# Patient Record
Sex: FEMALE | Employment: UNEMPLOYED | URBAN - METROPOLITAN AREA
[De-identification: names, ages, dates, MRNs, and addresses within clinical notes are randomized per-mention and may not be internally consistent; named-entity substitution may affect disease eponyms.]

---

## 2022-09-02 ENCOUNTER — HOSPITAL ENCOUNTER (OUTPATIENT)
Dept: CT IMAGING | Age: 62
Discharge: HOME | End: 2022-09-05

## 2022-09-02 DIAGNOSIS — K59.09 OTHER CONSTIPATION: ICD-10-CM

## 2022-09-02 DIAGNOSIS — R12 HEARTBURN: ICD-10-CM

## 2022-09-02 DIAGNOSIS — R10.10 UPPER ABDOMINAL PAIN, UNSPECIFIED: ICD-10-CM

## 2022-09-02 DIAGNOSIS — R14.0 ABDOMINAL DISTENTION: ICD-10-CM

## 2022-09-26 ENCOUNTER — HOSPITAL ENCOUNTER (EMERGENCY)
Age: 62
Discharge: HOME | End: 2022-09-26
Attending: EMERGENCY MEDICINE
Payer: COMMERCIAL

## 2022-09-26 ENCOUNTER — HOSPITAL ENCOUNTER (EMERGENCY)
Dept: CT IMAGING | Age: 62
Discharge: HOME | End: 2022-09-29
Payer: COMMERCIAL

## 2022-09-26 VITALS
TEMPERATURE: 97.6 F | BODY MASS INDEX: 34.15 KG/M2 | HEART RATE: 111 BPM | DIASTOLIC BLOOD PRESSURE: 84 MMHG | OXYGEN SATURATION: 97 % | RESPIRATION RATE: 18 BRPM | SYSTOLIC BLOOD PRESSURE: 173 MMHG | WEIGHT: 200 LBS | HEIGHT: 64 IN

## 2022-09-26 DIAGNOSIS — R10.30 LOWER ABDOMINAL PAIN: Primary | ICD-10-CM

## 2022-09-26 DIAGNOSIS — R19.00 PELVIC MASS IN FEMALE: ICD-10-CM

## 2022-09-26 DIAGNOSIS — N30.01 ACUTE CYSTITIS WITH HEMATURIA: ICD-10-CM

## 2022-09-26 LAB
ALBUMIN SERPL-MCNC: 3.5 G/DL (ref 3.2–4.6)
ALBUMIN/GLOB SERPL: 0.7 {RATIO} (ref 1.2–3.5)
ALP SERPL-CCNC: 90 U/L (ref 50–130)
ALT SERPL-CCNC: 19 U/L (ref 12–65)
ANION GAP SERPL CALC-SCNC: 11 MMOL/L (ref 4–13)
APPEARANCE UR: ABNORMAL
AST SERPL-CCNC: 18 U/L (ref 15–37)
BACTERIA URNS QL MICRO: 0 /HPF
BASOPHILS # BLD: 0 K/UL (ref 0–0.2)
BASOPHILS NFR BLD: 1 % (ref 0–2)
BILIRUB SERPL-MCNC: 0.8 MG/DL (ref 0.2–1.1)
BILIRUB UR QL: NEGATIVE
BUN SERPL-MCNC: 12 MG/DL (ref 8–23)
CALCIUM SERPL-MCNC: 10.2 MG/DL (ref 8.3–10.4)
CASTS URNS QL MICRO: 0 /LPF
CHLORIDE SERPL-SCNC: 104 MMOL/L (ref 101–110)
CO2 SERPL-SCNC: 22 MMOL/L (ref 21–32)
COLOR UR: ABNORMAL
CREAT SERPL-MCNC: 0.65 MG/DL (ref 0.6–1)
CRYSTALS URNS QL MICRO: 0 /LPF
DIFFERENTIAL METHOD BLD: NORMAL
EOSINOPHIL # BLD: 0.1 K/UL (ref 0–0.8)
EOSINOPHIL NFR BLD: 1 % (ref 0.5–7.8)
EPI CELLS #/AREA URNS HPF: NORMAL /HPF
ERYTHROCYTE [DISTWIDTH] IN BLOOD BY AUTOMATED COUNT: 13.2 % (ref 11.9–14.6)
GLOBULIN SER CALC-MCNC: 4.7 G/DL (ref 2.3–3.5)
GLUCOSE SERPL-MCNC: 69 MG/DL (ref 65–100)
GLUCOSE UR STRIP.AUTO-MCNC: NEGATIVE MG/DL
HCT VFR BLD AUTO: 43.7 % (ref 35.8–46.3)
HGB BLD-MCNC: 13.8 G/DL (ref 11.7–15.4)
HGB UR QL STRIP: ABNORMAL
IMM GRANULOCYTES # BLD AUTO: 0 K/UL (ref 0–0.5)
IMM GRANULOCYTES NFR BLD AUTO: 0 % (ref 0–5)
KETONES UR QL STRIP.AUTO: >80 MG/DL
LACTATE SERPL-SCNC: 0.7 MMOL/L (ref 0.4–2)
LEUKOCYTE ESTERASE UR QL STRIP.AUTO: ABNORMAL
LIPASE SERPL-CCNC: 115 U/L (ref 73–393)
LYMPHOCYTES # BLD: 1.8 K/UL (ref 0.5–4.6)
LYMPHOCYTES NFR BLD: 21 % (ref 13–44)
MCH RBC QN AUTO: 27.9 PG (ref 26.1–32.9)
MCHC RBC AUTO-ENTMCNC: 31.6 G/DL (ref 31.4–35)
MCV RBC AUTO: 88.5 FL (ref 79.6–97.8)
MONOCYTES # BLD: 0.7 K/UL (ref 0.1–1.3)
MONOCYTES NFR BLD: 8 % (ref 4–12)
MUCOUS THREADS URNS QL MICRO: 0 /LPF
NEUTS SEG # BLD: 6.1 K/UL (ref 1.7–8.2)
NEUTS SEG NFR BLD: 70 % (ref 43–78)
NITRITE UR QL STRIP.AUTO: NEGATIVE
NRBC # BLD: 0 K/UL (ref 0–0.2)
PH UR STRIP: 5 [PH] (ref 5–9)
PLATELET # BLD AUTO: 388 K/UL (ref 150–450)
PMV BLD AUTO: 9.7 FL (ref 9.4–12.3)
POTASSIUM SERPL-SCNC: 3.8 MMOL/L (ref 3.5–5.1)
PROT SERPL-MCNC: 8.2 G/DL (ref 6.3–8.2)
PROT UR STRIP-MCNC: 30 MG/DL
RBC # BLD AUTO: 4.94 M/UL (ref 4.05–5.2)
RBC #/AREA URNS HPF: NORMAL /HPF
SODIUM SERPL-SCNC: 137 MMOL/L (ref 136–145)
SP GR UR REFRACTOMETRY: >1.035 (ref 1–1.02)
UROBILINOGEN UR QL STRIP.AUTO: 0.2 EU/DL (ref 0.2–1)
WBC # BLD AUTO: 8.7 K/UL (ref 4.3–11.1)
WBC URNS QL MICRO: NORMAL /HPF

## 2022-09-26 PROCEDURE — 6360000004 HC RX CONTRAST MEDICATION: Performed by: EMERGENCY MEDICINE

## 2022-09-26 PROCEDURE — 83690 ASSAY OF LIPASE: CPT

## 2022-09-26 PROCEDURE — 80053 COMPREHEN METABOLIC PANEL: CPT

## 2022-09-26 PROCEDURE — 83605 ASSAY OF LACTIC ACID: CPT

## 2022-09-26 PROCEDURE — 99285 EMERGENCY DEPT VISIT HI MDM: CPT

## 2022-09-26 PROCEDURE — 74177 CT ABD & PELVIS W/CONTRAST: CPT

## 2022-09-26 PROCEDURE — 6370000000 HC RX 637 (ALT 250 FOR IP): Performed by: NURSE PRACTITIONER

## 2022-09-26 PROCEDURE — 81001 URINALYSIS AUTO W/SCOPE: CPT

## 2022-09-26 PROCEDURE — 2580000003 HC RX 258: Performed by: EMERGENCY MEDICINE

## 2022-09-26 PROCEDURE — 85025 COMPLETE CBC W/AUTO DIFF WBC: CPT

## 2022-09-26 PROCEDURE — 2580000003 HC RX 258: Performed by: NURSE PRACTITIONER

## 2022-09-26 RX ORDER — 0.9 % SODIUM CHLORIDE 0.9 %
1000 INTRAVENOUS SOLUTION INTRAVENOUS ONCE
Status: COMPLETED | OUTPATIENT
Start: 2022-09-26 | End: 2022-09-26

## 2022-09-26 RX ORDER — 0.9 % SODIUM CHLORIDE 0.9 %
100 INTRAVENOUS SOLUTION INTRAVENOUS
Status: COMPLETED | OUTPATIENT
Start: 2022-09-26 | End: 2022-09-26

## 2022-09-26 RX ORDER — ONDANSETRON 2 MG/ML
4 INJECTION INTRAMUSCULAR; INTRAVENOUS ONCE
Status: DISCONTINUED | OUTPATIENT
Start: 2022-09-26 | End: 2022-09-26

## 2022-09-26 RX ORDER — MORPHINE SULFATE 4 MG/ML
4 INJECTION INTRAVENOUS
Status: DISCONTINUED | OUTPATIENT
Start: 2022-09-26 | End: 2022-09-26

## 2022-09-26 RX ORDER — SODIUM CHLORIDE 0.9 % (FLUSH) 0.9 %
10 SYRINGE (ML) INJECTION
Status: DISCONTINUED | OUTPATIENT
Start: 2022-09-26 | End: 2022-09-30 | Stop reason: HOSPADM

## 2022-09-26 RX ORDER — CEPHALEXIN 500 MG/1
500 CAPSULE ORAL 2 TIMES DAILY
Qty: 14 CAPSULE | Refills: 0 | Status: SHIPPED | OUTPATIENT
Start: 2022-09-26 | End: 2022-10-03

## 2022-09-26 RX ORDER — ACETAMINOPHEN 500 MG
1000 TABLET ORAL
Status: COMPLETED | OUTPATIENT
Start: 2022-09-26 | End: 2022-09-26

## 2022-09-26 RX ADMIN — SODIUM CHLORIDE 1000 ML: 9 INJECTION, SOLUTION INTRAVENOUS at 10:49

## 2022-09-26 RX ADMIN — ACETAMINOPHEN 1000 MG: 500 TABLET, FILM COATED ORAL at 11:05

## 2022-09-26 RX ADMIN — IOPAMIDOL 100 ML: 755 INJECTION, SOLUTION INTRAVENOUS at 12:23

## 2022-09-26 RX ADMIN — SODIUM CHLORIDE 100 ML: 9 INJECTION, SOLUTION INTRAVENOUS at 12:24

## 2022-09-26 ASSESSMENT — PAIN DESCRIPTION - LOCATION: LOCATION: ABDOMEN

## 2022-09-26 ASSESSMENT — PAIN SCALES - GENERAL
PAINLEVEL_OUTOF10: 0
PAINLEVEL_OUTOF10: 0

## 2022-09-26 NOTE — ED NOTES
I have reviewed discharge instructions with the patient. The patient verbalized understanding. Patient left ED via Discharge Method: ambulatory to Home with self. Opportunity for questions and clarification provided. Patient given 1 scripts. To continue your aftercare when you leave the hospital, you may receive an automated call from our care team to check in on how you are doing. This is a free service and part of our promise to provide the best care and service to meet your aftercare needs.  If you have questions, or wish to unsubscribe from this service please call [phone]. Thank you for Choosing our [facility] Emergency Department.         Rachelle Ellis RN  09/26/22 2774

## 2022-09-26 NOTE — ED TRIAGE NOTES
Pt states that she's been having left sided abdominal pain for about 2 months. Pt states that the pain radiates out to the rest of her abdomen. Pt endorses that she was recently diagnosed with a mass on her left ovary about a month ago and has an appointment with GYN but states that the pain has gotten much worse. Pt also endorses some vagina bleeding starting this morning.

## 2022-09-26 NOTE — DISCHARGE INSTRUCTIONS
Your CT scan again showed a large cystic mass originating in your pelvis. It is quite important that you follow-up closely with gynecology for this. Call above gynecologist Tuesday for earliest possible follow-up. Return for new or worsening symptoms.

## 2022-09-26 NOTE — ED PROVIDER NOTES
[facility] Emergency Department Provider Note                   PCP:                None Provider               Age: 60 y.o. Sex: female       ICD-10-CM    1. Lower abdominal pain  R10.30       2. Pelvic mass in female  R19.00       3. Acute cystitis with hematuria  N30.01           DISPOSITION    Discharged    MDM     HPI as charted. Pt neck is supple, no meningeal signs. Lungs are clear to auscultation, no diminished sounds. Abdomen is soft and diffusely tender, has normoactive bowel sounds, no flank or CVA tenderness. Reports ongoing symptoms, states she has follow-up with GYN in approximately 1 month. Was seen by new PCP ordered CT scan with findings as below:    9/14:     1. 16.2 x 26.1 x 26.3 complex cystic and solid mass arising the central pelvis   and extending through the upper abdomen. 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 OB/GYN evaluation is recommended. Discussed concerns with patient and , obtain labs and UA which were reviewed. Labs are quite reassuring, UA concerning for UTI and we will treat. With patient's report of increasing abdominal pain and distention and prior concerning CT findings, patient repeat CT was obtained. Findings as below, slight increase in size of complex mass, but thought to be otherwise stable. Stable for discharge home, but thinks that close GYN follow-up would be in the patient's best interest.  I discussed with the patient and her  findings and concern that this mass very realistically could represent cancer and that it is imperative she have close appropriate follow-up. He verbalized understanding. I have called and got GYN follow-up contact for the patient advised to call upon discharge this morning schedule earliest possible follow-up. Strict return to ED for care instruction provided. Advised to follow-up with PCP in 1-2 days. Return to ED immediately for any new, worsening, concerning symptoms.    Patient is very well-hydrated appearing, no distress, pleasant and conversational.  Nontoxic-appearing, tolerating oral intake. Patient is hemodynamically stable and in no acute distress. Patient is not ill-appearing. Discussed patient with attending who reviewed the patient's results and collaborated on care planning. All findings and plans were discussed with the patient. Patient verbalizes desire to be discharged home at this time. All questions answered. Discussed with the patient that an unremarkable evaluation in the ED does not preclude the development or presence of a serious or life threatening condition. Patient was instructed to return immediately for any worsening or change in current symptoms, or if symptoms do not continue to improve. I instructed them to follow up with their primary care provider, own specialist, or medical provider that I am recommending for him within the next 2-3 days     the patient acknowledged understanding plan of care and affirmed approval. Patient is discharged home, with no further complaint. Orders Placed This Encounter   Procedures    CBC with Diff    CMP    Lipase    Diet NPO    POCT Urine Dipstick    Saline lock IV        Lakeshia Mckay is a 60 y.o. female who presents to the Emergency Department with chief complaint of    Chief Complaint   Patient presents with    Abdominal Pain    Vaginal Bleeding      HPI  58-year-old female presents to the ED with her  with complaint of worsening abdominal pain, distention and new constipation. States that she has had progressively worsening pain in her lower abdomen x2 months, which has increased significant over the last 3 days. States that when wiping herself on toilet paper after urinating this morning, she noticed a small amount of blood on toilet paper x1. No bleeding, no visualized blood in urine. Denies black or bloody stools, states she is not had a bowel movement in the last few days.   Or endorses decreased appetite and decreased oral intake. Denies weight loss. States that her abdominal pain was not originally localized to the lower left quadrant of her abdomen, now spread across her entire abdomen with new onset distention and tenderness. Are new in the area and have no real establish health care. Were able to be seen by provider last month for this complaint who ordered an outpatient image. States that they were told that there was a left ovarian cyst but nothing further. States that they were able to schedule appointment with an OB/GYN, but that appointment remains 3+ weeks away. They called back to the provider originally evaluated them who told him to come to the emergency department where we could facilitate closer care. Denies fever/chills, blurred vision, headaches, neck pain/stiffness. Alert & oriented x 3, afebrile, hemodynamically stable, non-toxic appearing, appears in no distress. Medical/surgical/social history reviewed with the patient. Review of Systems  Constitutional: Negative for fever. Negative for appetite change, chills, diaphoresis and unexpected weight change. HENT: As in HPI     Eyes: Negative. Respiratory: As in HPI   Cardiovascular: Negative. GI/: As in HPI      Musculoskeletal: As in HPI   Skin: Negative. Allergic/Immunologic: Negative. Neurological: Negative. History reviewed. No pertinent past medical history. Past Surgical History:   Procedure Laterality Date    HERNIA REPAIR      TONSILLECTOMY          History reviewed. No pertinent family history. Social History     Socioeconomic History    Marital status:      Spouse name: None    Number of children: None    Years of education: None    Highest education level: None         Patient has no known allergies. Previous Medications    No medications on file        Vitals signs and nursing note reviewed.    Patient Vitals for the past 4 hrs:   Temp Pulse Resp BP SpO2   09/26/22 0929 97.8 °F (36.6 °C) (!) 111 18 (!) 144/75 99 %          Physical Exam   Constitutional: Oriented to person, place, and time. Appears well-developed and well-nourished. No distress. HENT:    Head: Normocephalic and atraumatic. Right Ear: External ear normal.    Left Ear: External ear normal.    Nose: Nose normal.    Mouth/Throat: Mouth normal. Uvula midline, no erythema/exudates/edema. No drooling, no voice change. No pain with ROM of neck. Eyes: Conjunctivae are normal.   Neck: Supple. No tracheal deviation. Not tender, not rigid, no menningeal signs. Cardiovascular: Normal rate, intact distal pulses. Pulmonary/Chest: No respiratory distress. Abdominal:  and distended with tenderness and guarding diffusely. Normoactive sounds. No flank or CVA tenderness. Musculoskeletal: No obvious deformity, erythema, edema. Neurological: Alert and oriented to person, place, and time. Skin:  No abrasion, no lesion, no petechiae and no rash noted. Not diaphoretic. No cyanosis, erythema, or pallor. Psychiatric: Normal mood and affect. Behavior is normal.    Nursing note and vitals reviewed. Procedures      Labs Reviewed   COMPREHENSIVE METABOLIC PANEL - Abnormal; Notable for the following components:       Result Value    Globulin 4.7 (*)     Albumin/Globulin Ratio 0.7 (*)     All other components within normal limits   URINALYSIS - Abnormal; Notable for the following components:    Specific Gravity, UA >1.035 (*)     Protein, UA 30 (*)     Ketones, Urine >80 (*)     Blood, Urine LARGE (*)     Leukocyte Esterase, Urine MODERATE (*)     All other components within normal limits   CBC WITH AUTO DIFFERENTIAL   LIPASE   LACTIC ACID   URINALYSIS, MICRO        CT ABDOMEN PELVIS W IV CONTRAST Additional Contrast? None   Final Result   Large, complex, largely cystic mass originating from the pelvis and concerning   for for an ovarian neoplasm measures slightly larger in today's study but is   otherwise grossly stable.  No acute findings in the abdomen or pelvis. Voice dictation software was used during the making of this note. This software is not perfect and grammatical and other typographical errors may be present. This note has not been completely proofread for errors.          FARHANA De Guzman - CNP  09/26/22 2100

## 2022-10-03 ENCOUNTER — TELEPHONE (OUTPATIENT)
Dept: OBGYN CLINIC | Age: 62
End: 2022-10-03

## 2022-10-03 ENCOUNTER — OFFICE VISIT (OUTPATIENT)
Dept: OBGYN CLINIC | Age: 62
End: 2022-10-03
Payer: COMMERCIAL

## 2022-10-03 VITALS
WEIGHT: 207 LBS | HEIGHT: 63 IN | DIASTOLIC BLOOD PRESSURE: 78 MMHG | SYSTOLIC BLOOD PRESSURE: 128 MMHG | BODY MASS INDEX: 36.68 KG/M2

## 2022-10-03 DIAGNOSIS — Z12.4 SCREENING FOR CERVICAL CANCER: ICD-10-CM

## 2022-10-03 DIAGNOSIS — N83.8 OVARIAN MASS: Primary | ICD-10-CM

## 2022-10-03 DIAGNOSIS — Z12.11 SCREENING FOR COLON CANCER: ICD-10-CM

## 2022-10-03 LAB
HEMOCCULT STL QL: NEGATIVE
VALID INTERNAL CONTROL: YES

## 2022-10-03 PROCEDURE — 99215 OFFICE O/P EST HI 40 MIN: CPT | Performed by: OBSTETRICS & GYNECOLOGY

## 2022-10-03 PROCEDURE — 82272 OCCULT BLD FECES 1-3 TESTS: CPT | Performed by: OBSTETRICS & GYNECOLOGY

## 2022-10-03 NOTE — TELEPHONE ENCOUNTER
Attempted to contact patient to discuss possible dates for recommended surgery per Dr Taiwo Vera ([facility]). N/A, L/M on voicemail for patient to return my call at her earliest convenience. Pt returned call and surgery is scheduled for 10/28/22.

## 2022-10-03 NOTE — PROGRESS NOTES
The patient is here for  problems including 28 cm pel mass     HISTORY:      No LMP recorded (lmp unknown). (Menstrual status: Other - See Notes). SEE BELOW      Current Outpatient Medications on File Prior to Visit   Medication Sig Dispense Refill    cephALEXin (KEFLEX) 500 MG capsule Take 1 capsule by mouth 2 times daily for 7 days 14 capsule 0     No current facility-administered medications on file prior to visit. SEE LIST    ROS:      PHYSICAL EXAM:  Blood pressure 128/78, height 5' 3\" (1.6 m), weight 207 lb (93.9 kg). The patient appears well, alert, oriented x 3, in no distress. Lungs are clear. Heart RRR, no murmurs. Abdomen soft without tenderness, guarding, mass or organomegaly. Pelvic: bg     ASSESSMENT:DIAGNOSIS DISCUSSED INCLUDING DIFFERENTIAL    PLAN:    Orders Placed This Encounter   Procedures    PAP LB, Reflex HPV ASCUS     Standing Status:   Future     Number of Occurrences:   1     Standing Expiration Date:   10/3/2023     Order Specific Question:   Pap Source? (Required)     Answer:   cervical     Order Specific Question:   Pap Source? (Required)     Answer:   endocervical     Order Specific Question:   Pap collection method? (Required     Answer:   brush     Order Specific Question:   Pap collection method? (Required     Answer:   spatula     Order Specific Question:   Menstrual Status ? Answer:   Postmenopausal [2]     Order Specific Question:   Previous Treatment? (Required)     Answer:   NONE    Cancer Antigen 125     Standing Status:   Future     Number of Occurrences:   1     Standing Expiration Date:   10/3/2023    AMB POC FECAL BLOOD, OCCULT, QL 1 CARD     Complex high risk decision making many questions answered history reviewed precharting done required 40 minute of time discussing a vaiety of problems  goals are set  follow up planned   En Rowan  is a 60 y.o. No obstetric history on file.   who is here for an annual exam.      History  History reviewed. No pertinent past medical history. ON FILE  Past Surgical History:   Procedure Laterality Date    HERNIA REPAIR      TONSILLECTOMY      PRESENT IN FILE  Current Outpatient Medications on File Prior to Visit   Medication Sig Dispense Refill    cephALEXin (KEFLEX) 500 MG capsule Take 1 capsule by mouth 2 times daily for 7 days 14 capsule 0     No current facility-administered medications on file prior to visit. SEE UPDATED LIST  No Known AllergiesSEE LIST  Social History     Tobacco Use    Smoking status: Not on file    Smokeless tobacco: Not on file   Substance Use Topics    Alcohol use: Not on file      History reviewed. No pertinent family history. OBGYN History:             Physical Exam  Blood pressure 128/78, height 5' 3\" (1.6 m), weight 207 lb (93.9 kg). Body mass index is 36.67 kg/m². Lab Results   Component Value Date/Time    HGB 13.8 09/26/2022 10:13 AM      @LASTPROCAMB([id number])@  No results found for: Georgeana Los, HCGQR, THCGA1    HEENT unremarkable. Sclera non-icteric. Neck is supple without thyromegaly or nodes. Chest clear to auscultation. Heart regular rate and rhythm with no murmur, Breast exam reveals no masses or nipple discharge. No axillary notes are palpable. Abdomen is benign. BUS is normal.  Cervix IF  present. Pap smeaR performed. PRN  Bimanual exam reveals no masses. Assessment  60 y.o. No obstetric history on file. for annual exam.  No diagnosis found. Plan  No orders of the defined types were placed in this encounter. 28 cm pel mass  col ok per ct  no asci/nodes ca125 p  for [facility] [employer] takes no meds no cardiac dx   recent ct  pulm atelect lung work discussed no rivera  Pt aware all risks including anesthesia,infection. blood loss,transfusion,injury to bowel bladder[colostomy/longterm huffman] internal organs ,risk of not being able to accomplish procedure , risk of longterm resid pain,dvt  and  ,pe    \"DEXA ordered\",AT AGE 61          \"Screening olonoscopy ordered\",IF >44YO  DUE         \"Recommend Calcium/MVI\",IF >35YRS  \"Recommend Gardisil immunization\"IF AGE 9-45     }CONTRACEPTION DISCUSSED      STD CHECK OFFERED IF <30YO  ,MAMMOGRAM SCHEDULED IF >41YO          MOOD DISCUSSED             NOT SUICIDAL  HEALTH MEASURE DISCUSSED INCLUDING TEETH/EYE Barbra Stock  APPTS                    DIET/EXERCISE /SLEEP    DISCUSSED                SMOKING DISCUSSED PRN      BLADDER CONTROL DISCUSSED

## 2022-10-04 ENCOUNTER — PREP FOR PROCEDURE (OUTPATIENT)
Dept: OBGYN CLINIC | Age: 62
End: 2022-10-04

## 2022-10-04 PROBLEM — D25.1 INTRAMURAL LEIOMYOMA OF UTERUS: Status: ACTIVE | Noted: 2022-10-04

## 2022-10-04 LAB — CANCER AG125 SERPL-ACNC: 108 U/ML (ref 1.5–35)

## 2022-10-05 ENCOUNTER — TELEPHONE (OUTPATIENT)
Dept: OBGYN CLINIC | Age: 62
End: 2022-10-05

## 2022-10-05 DIAGNOSIS — N83.8 OVARIAN MASS: Primary | ICD-10-CM

## 2022-10-05 NOTE — TELEPHONE ENCOUNTER
Called the pt to let her know that her blood work  was elevated and  wants to transfer her care to [address] [place] [address].  referral put on [facility] as urgent referral.

## 2022-10-06 LAB
CYTOLOGIST CVX/VAG CYTO: NORMAL
CYTOLOGY CVX/VAG DOC THIN PREP: NORMAL
HPV REFLEX: NORMAL
Lab: NORMAL
PATH REPORT.FINAL DX SPEC: NORMAL
STAT OF ADQ CVX/VAG CYTO-IMP: NORMAL

## 2022-10-07 ENCOUNTER — PREP FOR PROCEDURE (OUTPATIENT)
Dept: ONCOLOGY | Age: 62
End: 2022-10-07

## 2022-10-07 ENCOUNTER — OFFICE VISIT (OUTPATIENT)
Dept: ONCOLOGY | Age: 62
End: 2022-10-07
Payer: COMMERCIAL

## 2022-10-07 VITALS
SYSTOLIC BLOOD PRESSURE: 153 MMHG | DIASTOLIC BLOOD PRESSURE: 93 MMHG | BODY MASS INDEX: 34.91 KG/M2 | WEIGHT: 204.5 LBS | OXYGEN SATURATION: 97 % | RESPIRATION RATE: 18 BRPM | HEIGHT: 64 IN | TEMPERATURE: 98.1 F | HEART RATE: 115 BPM

## 2022-10-07 DIAGNOSIS — R19.00 ABDOMINAL MASS, UNSPECIFIED ABDOMINAL LOCATION: Primary | ICD-10-CM

## 2022-10-07 PROCEDURE — 99204 OFFICE O/P NEW MOD 45 MIN: CPT | Performed by: OBSTETRICS & GYNECOLOGY

## 2022-10-07 RX ORDER — HYDROCODONE BITARTRATE AND ACETAMINOPHEN 10; 325 MG/1; MG/1
1 TABLET ORAL EVERY 6 HOURS PRN
Qty: 40 TABLET | Refills: 0 | Status: SHIPPED | OUTPATIENT
Start: 2022-10-07 | End: 2022-10-21

## 2022-10-07 RX ORDER — PROCHLORPERAZINE MALEATE 5 MG/1
5 TABLET ORAL EVERY 6 HOURS PRN
Qty: 30 TABLET | Refills: 0 | Status: SHIPPED | OUTPATIENT
Start: 2022-10-07

## 2022-10-07 RX ORDER — ONDANSETRON 4 MG/1
4 TABLET, ORALLY DISINTEGRATING ORAL EVERY 8 HOURS PRN
Qty: 45 TABLET | Refills: 1 | Status: SHIPPED | OUTPATIENT
Start: 2022-10-07

## 2022-10-07 ASSESSMENT — ENCOUNTER SYMPTOMS
CONSTIPATION: 1
ALLERGIC/IMMUNOLOGIC NEGATIVE: 1
NAUSEA: 1
EYES NEGATIVE: 1
RESPIRATORY NEGATIVE: 1
ABDOMINAL DISTENTION: 1
ABDOMINAL PAIN: 1

## 2022-10-07 NOTE — PERIOP NOTE
Patient verified name and . Order for consent NOT found in EHR; patient verifies procedure from case posting. Type 2 surgery, phone assessment complete. Orders NOT received. Labs per surgeon: Unknown  Labs per anesthesia protocol: Hgb & Type Screen DOS     Patient answered medical/surgical history questions at their best of ability. All prior to admission medications documented in [facility]. Patient instructed to take the following medications the day of surgery according to anesthesia guidelines with a small sip of water: NONE. On the day before surgery please take Acetaminophen 1000mg in the morning and then again before bed. You may substitute for Tylenol 650 mg. Hold all vitamins 7 days prior to surgery and NSAIDS 5 days prior to surgery. Prescription meds to hold: NONE    Patient instructed on the following:    > Arrive at MAIN Entrance, time of arrival to be called the day before by 1700  > NPO after midnight, unless otherwise indicated, including gum, mints, and ice chips  > Responsible adult must drive patient to the hospital, stay during surgery, and patient will need supervision 24 hours after anesthesia  > Use antibacterial soap in shower the night before surgery and on the morning of surgery  > All piercings must be removed prior to arrival.    > Leave all valuables (money and jewelry) at home but bring insurance card and ID on DOS.   > You may be required to pay a deductible or co-pay on the day of your procedure. You can pre-pay by calling [phone] if your surgery is at the [facility] or [phone] if your surgery is at the [facility]. > Do not wear make-up, nail polish, lotions, cologne, perfumes, powders, or oil on skin. Artificial nails are not permitted.

## 2022-10-07 NOTE — PROGRESS NOTES
Date: 10/7/2022        Patient Name: Khris Paige     YOB: 1960          Chief Complaint     Chief Complaint   Patient presents with    New Patient      Large pelvic mass   >20 cm, complex   Mildly elevated ca 125   First noted early sept 2022/then again on second ct sept 26, 2022      History of Present Illness   Khris Paige is a 60 y.o. who presents today for scheduled visit    Ref: dr Angelica Head    Pt with hx abdominal pain over 1 months  Reported to pcp, imaging beginning sept 2022, >20 cm mass  To er late sept 2022, referred to dr Angelica Head, who scheduled surgery but then cancelled and consult sent to gyn oncology     Past Medical History   No past medical history on file. Past Surgical History     Past Surgical History:   Procedure Laterality Date    HERNIA REPAIR      TONSILLECTOMY          Medications      Current Outpatient Medications:     ondansetron (ZOFRAN ODT) 4 MG disintegrating tablet, Take 1 tablet by mouth every 8 hours as needed for Nausea or Vomiting, Disp: 45 tablet, Rfl: 1    prochlorperazine (COMPAZINE) 5 MG tablet, Take 1 tablet by mouth every 6 hours as needed for Nausea, Disp: 30 tablet, Rfl: 0    HYDROcodone-acetaminophen (NORCO)  MG per tablet, Take 1 tablet by mouth every 6 hours as needed for Pain for up to 14 days. Intended supply: 30 days, Disp: 40 tablet, Rfl: 0     Allergies   Patient has no known allergies. Social History     Social History       Tobacco History       Smoking Status  Never Assessed      Smokeless Tobacco Use  Unknown              Alcohol History       Alcohol Use Status  Not Asked              Drug Use       Drug Use Status  Not Asked              Sexual Activity       Sexually Active  Not Asked                    Family History   No family history on file. Review of Systems   Review of Systems   Constitutional:  Positive for activity change and appetite change. HENT: Negative. Eyes: Negative.     Respiratory: Negative. Cardiovascular: Negative. Gastrointestinal:  Positive for abdominal distention, abdominal pain, constipation and nausea. Endocrine: Negative. Genitourinary: Negative. Musculoskeletal: Negative. Skin: Negative. Allergic/Immunologic: Negative. Neurological: Negative. Hematological: Negative. Psychiatric/Behavioral: Negative. All other systems reviewed and are negative. Physical Exam     ECOG PERFORMANCE STATUS - 2 - Ambulatory and capable of all selfcare but unable to carry out any work activities. Up and about more than 50% of waking hours. Blood pressure (!) 153/93, pulse (!) 115, temperature 98.1 °F (36.7 °C), resp. rate 18, height 5' 3.78\" (1.62 m), weight 204 lb 8 oz (92.8 kg), SpO2 97 %. Physical Exam  Vitals and nursing note reviewed. Exam conducted with a chaperone present. Constitutional:       Appearance: Normal appearance. HENT:      Head: Normocephalic and atraumatic. Nose: No congestion. Cardiovascular:      Rate and Rhythm: Normal rate and regular rhythm. Pulses: Normal pulses. Pulmonary:      Effort: Pulmonary effort is normal. No respiratory distress. Breath sounds: Normal breath sounds. Abdominal:      General: Abdomen is flat. There is distension. Palpations: Abdomen is soft. There is mass. Neurological:      Mental Status: She is alert. Labs        No results found for this or any previous visit (from the past 48 hour(s)). Lab Results   Component Value Date     108 (H) 10/03/2022        Pathology      Component Ref Range & Units 10/3/22 1135    Diagnosis:   Comment    Comment: (NOTE)   NEGATIVE FOR INTRAEPITHELIAL LESION OR MALIGNANCY. Imaging/Diagnostics Last 24 Hours   No results found.     Radiology:    CT Result (most recent):  CT ABDOMEN PELVIS W IV CONTRAST 09/26/2022    Narrative  CT OF THE ABDOMEN AND PELVIS    INDICATION: Diffuse abdominal pain with recent worsening    COMPARISON: CT abdomen pelvis 9/2/2022. TECHNIQUE: Multiple axial images were obtained through the abdomen and pelvis  after intravenous injection of 100 mL Isovue 370. Intravenous contrast was used  for better evaluation of solid organs and vascular structures. Oral contrast was  used for bowel opacification. Radiation dose reduction techniques were used for  this study. Our CT scanners use one or all of the following: Automated exposure  control, adjustment of the mA and/or kV according to patient size, iterative  reconstruction. FINDINGS:  Visualized thorax: Atelectasis in both lung bases. .    Liver: Normal in size and morphology. No focal lesions. Gallbladder/biliary: Normal gallbladder. No biliary dilatation. Pancreas: Normal.    Spleen: Normal.    Adrenals: Normal.    Kidneys: No focal lesion or hydronephrosis. Bladder: Normal.    Pelvic organs: Massive complex cystic mass originating from the pelvis measuring  28 x 14 cm transversely by 29 cm craniocaudally, previously 26 x 14 x 26 cm. The  mass again demonstrates complex soft tissue components at its periphery and  exerts mass effect on adjacent organs extending into the abdomen to the level of  the kidneys. Gastrointestinal: No bowel obstruction. Moderate hiatal hernia. Peritoneum/retroperitoneum: Normal.    Lymph nodes: Normal.    Vessels: Mild aortic atherosclerotic disease. Bones/Soft tissues: No aggressive appearing bone lesion. Impression  Large, complex, largely cystic mass originating from the pelvis and concerning  for for an ovarian neoplasm measures slightly larger in today's study but is  otherwise grossly stable. No acute findings in the abdomen or pelvis. PET Results (most recent):  No results found for this or any previous visit from the past 365 days. Mammo Results (most recent):  No results found for this or any previous visit from the past 365 days.          US Result (most recent):  No results found for this or any previous visit from the past 3650 days. Assessment       Diagnosis Orders   1. Abdominal mass, unspecified abdominal location  HYDROcodone-acetaminophen (NORCO)  MG per tablet        Specialty Problems    None      Plan   1.proceed with cysto, stents, gillian/bso/pstagingw with vbert midline  Surgery planned Tuesday    I reviewed R/B/A. Risks include but are not limited to complications with anesthesia, drug reactions, infection, risk for bleeding requiring blood transfusion, damage to internal organs (bowel, bladder, ureters, kidneys, nerves, arteries, veins) requiring additional surgery, blood clots, stroke, respiratory problems, paralysis, brain damage and death.                   Miranda Street MD  [facility]  [phone] [id number]  Office : [phone]  Fax : [phone]

## 2022-10-07 NOTE — PATIENT INSTRUCTIONS
Patient Instructions from Today's Visit    Reason for Visit:  New Patient    Diagnosis Information:  [email or web address]      Plan: We recommend surgery to remove the mass    Follow Up:  1 week after surgery    Recent Lab Results: n/a      Treatment Summary has been discussed and given to patient: n/a        -------------------------------------------------------------------------------------------------------------------    Patient does express an interest in My Chart. My Chart log in information explained on the after visit summary printout at the [facility] desk.     Bernice Ramirez, RN, MSN, OCN  Gynecology Nurse Navigator for Dr. Patrizia Hopkins  [address] [place] [address]  Phone:  [phone]  Fax: [phone]  Email: [email or web address]

## 2022-10-10 ENCOUNTER — PREP FOR PROCEDURE (OUTPATIENT)
Dept: ONCOLOGY | Age: 62
End: 2022-10-10

## 2022-10-10 RX ORDER — SODIUM CHLORIDE 0.9 % (FLUSH) 0.9 %
5-40 SYRINGE (ML) INJECTION EVERY 12 HOURS SCHEDULED
Status: CANCELLED | OUTPATIENT
Start: 2022-10-10

## 2022-10-10 RX ORDER — SODIUM CHLORIDE 0.9 % (FLUSH) 0.9 %
5-40 SYRINGE (ML) INJECTION PRN
Status: CANCELLED | OUTPATIENT
Start: 2022-10-10

## 2022-10-10 RX ORDER — SODIUM CHLORIDE 9 MG/ML
INJECTION, SOLUTION INTRAVENOUS PRN
Status: CANCELLED | OUTPATIENT
Start: 2022-10-10

## 2022-10-10 RX ORDER — HEPARIN SODIUM 5000 [USP'U]/ML
5000 INJECTION, SOLUTION INTRAVENOUS; SUBCUTANEOUS ONCE
Status: CANCELLED | OUTPATIENT
Start: 2022-10-10

## 2022-10-10 NOTE — PERIOP NOTE
Directly informed patient of pre op arrival time 1215 on 10/11. All questions answered. Pre op instructions reviewed. Left contact information for any additional questions or needs.

## 2022-10-11 ENCOUNTER — ANESTHESIA EVENT (OUTPATIENT)
Dept: SURGERY | Age: 62
DRG: 738 | End: 2022-10-11
Payer: COMMERCIAL

## 2022-10-11 ENCOUNTER — ANESTHESIA (OUTPATIENT)
Dept: SURGERY | Age: 62
DRG: 738 | End: 2022-10-11
Payer: COMMERCIAL

## 2022-10-11 ENCOUNTER — HOSPITAL ENCOUNTER (INPATIENT)
Age: 62
LOS: 1 days | Discharge: HOME | DRG: 738 | End: 2022-10-12
Attending: OBSTETRICS & GYNECOLOGY | Admitting: OBSTETRICS & GYNECOLOGY
Payer: COMMERCIAL

## 2022-10-11 DIAGNOSIS — R19.00 ABDOMINAL MASS: ICD-10-CM

## 2022-10-11 PROBLEM — D39.12: Status: ACTIVE | Noted: 2022-10-11

## 2022-10-11 LAB
ABO + RH BLD: NORMAL
BLOOD GROUP ANTIBODIES SERPL: NORMAL
HGB BLD-MCNC: 13.9 G/DL (ref 11.7–15.4)
SPECIMEN EXP DATE BLD: NORMAL

## 2022-10-11 PROCEDURE — 3700000000 HC ANESTHESIA ATTENDED CARE: Performed by: OBSTETRICS & GYNECOLOGY

## 2022-10-11 PROCEDURE — 0UT20ZZ RESECTION OF BILATERAL OVARIES, OPEN APPROACH: ICD-10-PCS | Performed by: OBSTETRICS & GYNECOLOGY

## 2022-10-11 PROCEDURE — 7100000001 HC PACU RECOVERY - ADDTL 15 MIN: Performed by: OBSTETRICS & GYNECOLOGY

## 2022-10-11 PROCEDURE — C1758 CATHETER, URETERAL: HCPCS | Performed by: OBSTETRICS & GYNECOLOGY

## 2022-10-11 PROCEDURE — 07BC0ZX EXCISION OF PELVIS LYMPHATIC, OPEN APPROACH, DIAGNOSTIC: ICD-10-PCS | Performed by: OBSTETRICS & GYNECOLOGY

## 2022-10-11 PROCEDURE — 6360000002 HC RX W HCPCS: Performed by: NURSE ANESTHETIST, CERTIFIED REGISTERED

## 2022-10-11 PROCEDURE — 88307 TISSUE EXAM BY PATHOLOGIST: CPT

## 2022-10-11 PROCEDURE — 6370000000 HC RX 637 (ALT 250 FOR IP): Performed by: OBSTETRICS & GYNECOLOGY

## 2022-10-11 PROCEDURE — 2500000003 HC RX 250 WO HCPCS: Performed by: NURSE ANESTHETIST, CERTIFIED REGISTERED

## 2022-10-11 PROCEDURE — 2580000003 HC RX 258: Performed by: ANESTHESIOLOGY

## 2022-10-11 PROCEDURE — 3600000004 HC SURGERY LEVEL 4 BASE: Performed by: OBSTETRICS & GYNECOLOGY

## 2022-10-11 PROCEDURE — 6370000000 HC RX 637 (ALT 250 FOR IP): Performed by: ANESTHESIOLOGY

## 2022-10-11 PROCEDURE — C1769 GUIDE WIRE: HCPCS | Performed by: OBSTETRICS & GYNECOLOGY

## 2022-10-11 PROCEDURE — 0UT70ZZ RESECTION OF BILATERAL FALLOPIAN TUBES, OPEN APPROACH: ICD-10-PCS | Performed by: OBSTETRICS & GYNECOLOGY

## 2022-10-11 PROCEDURE — 0UT90ZZ RESECTION OF UTERUS, OPEN APPROACH: ICD-10-PCS | Performed by: OBSTETRICS & GYNECOLOGY

## 2022-10-11 PROCEDURE — 88112 CYTOPATH CELL ENHANCE TECH: CPT

## 2022-10-11 PROCEDURE — 88305 TISSUE EXAM BY PATHOLOGIST: CPT

## 2022-10-11 PROCEDURE — 2720000010 HC SURG SUPPLY STERILE: Performed by: OBSTETRICS & GYNECOLOGY

## 2022-10-11 PROCEDURE — 6360000002 HC RX W HCPCS: Performed by: OBSTETRICS & GYNECOLOGY

## 2022-10-11 PROCEDURE — 6360000002 HC RX W HCPCS: Performed by: ANESTHESIOLOGY

## 2022-10-11 PROCEDURE — 3700000001 HC ADD 15 MINUTES (ANESTHESIA): Performed by: OBSTETRICS & GYNECOLOGY

## 2022-10-11 PROCEDURE — A4216 STERILE WATER/SALINE, 10 ML: HCPCS | Performed by: OBSTETRICS & GYNECOLOGY

## 2022-10-11 PROCEDURE — 86901 BLOOD TYPING SEROLOGIC RH(D): CPT

## 2022-10-11 PROCEDURE — 0DBU0ZZ EXCISION OF OMENTUM, OPEN APPROACH: ICD-10-PCS | Performed by: OBSTETRICS & GYNECOLOGY

## 2022-10-11 PROCEDURE — 1100000000 HC RM PRIVATE

## 2022-10-11 PROCEDURE — 0TJB8ZZ INSPECTION OF BLADDER, VIA NATURAL OR ARTIFICIAL OPENING ENDOSCOPIC: ICD-10-PCS | Performed by: OBSTETRICS & GYNECOLOGY

## 2022-10-11 PROCEDURE — 3600000014 HC SURGERY LEVEL 4 ADDTL 15MIN: Performed by: OBSTETRICS & GYNECOLOGY

## 2022-10-11 PROCEDURE — 7100000000 HC PACU RECOVERY - FIRST 15 MIN: Performed by: OBSTETRICS & GYNECOLOGY

## 2022-10-11 PROCEDURE — 2709999900 HC NON-CHARGEABLE SUPPLY: Performed by: OBSTETRICS & GYNECOLOGY

## 2022-10-11 PROCEDURE — 2500000003 HC RX 250 WO HCPCS: Performed by: OBSTETRICS & GYNECOLOGY

## 2022-10-11 PROCEDURE — 2580000003 HC RX 258: Performed by: OBSTETRICS & GYNECOLOGY

## 2022-10-11 PROCEDURE — 64488 TAP BLOCK BI INJECTION: CPT | Performed by: ANESTHESIOLOGY

## 2022-10-11 PROCEDURE — 0DBW0ZX EXCISION OF PERITONEUM, OPEN APPROACH, DIAGNOSTIC: ICD-10-PCS | Performed by: OBSTETRICS & GYNECOLOGY

## 2022-10-11 PROCEDURE — 85018 HEMOGLOBIN: CPT

## 2022-10-11 RX ORDER — HEPARIN SODIUM 5000 [USP'U]/ML
5000 INJECTION, SOLUTION INTRAVENOUS; SUBCUTANEOUS ONCE
Status: COMPLETED | OUTPATIENT
Start: 2022-10-11 | End: 2022-10-11

## 2022-10-11 RX ORDER — HYDROCODONE BITARTRATE AND ACETAMINOPHEN 10; 325 MG/1; MG/1
1 TABLET ORAL EVERY 4 HOURS PRN
Status: DISCONTINUED | OUTPATIENT
Start: 2022-10-11 | End: 2022-10-12 | Stop reason: HOSPADM

## 2022-10-11 RX ORDER — ONDANSETRON 2 MG/ML
4 INJECTION INTRAMUSCULAR; INTRAVENOUS EVERY 6 HOURS PRN
Status: DISCONTINUED | OUTPATIENT
Start: 2022-10-11 | End: 2022-10-11 | Stop reason: SDUPTHER

## 2022-10-11 RX ORDER — ONDANSETRON 2 MG/ML
INJECTION INTRAMUSCULAR; INTRAVENOUS PRN
Status: DISCONTINUED | OUTPATIENT
Start: 2022-10-11 | End: 2022-10-11 | Stop reason: SDUPTHER

## 2022-10-11 RX ORDER — SODIUM CHLORIDE 9 MG/ML
INJECTION, SOLUTION INTRAVENOUS PRN
Status: DISCONTINUED | OUTPATIENT
Start: 2022-10-11 | End: 2022-10-11 | Stop reason: HOSPADM

## 2022-10-11 RX ORDER — SODIUM CHLORIDE 0.9 % (FLUSH) 0.9 %
5-40 SYRINGE (ML) INJECTION PRN
Status: DISCONTINUED | OUTPATIENT
Start: 2022-10-11 | End: 2022-10-11 | Stop reason: HOSPADM

## 2022-10-11 RX ORDER — ONDANSETRON 2 MG/ML
4 INJECTION INTRAMUSCULAR; INTRAVENOUS
Status: DISCONTINUED | OUTPATIENT
Start: 2022-10-11 | End: 2022-10-11 | Stop reason: HOSPADM

## 2022-10-11 RX ORDER — KETOROLAC TROMETHAMINE 30 MG/ML
INJECTION, SOLUTION INTRAMUSCULAR; INTRAVENOUS PRN
Status: DISCONTINUED | OUTPATIENT
Start: 2022-10-11 | End: 2022-10-11 | Stop reason: SDUPTHER

## 2022-10-11 RX ORDER — SODIUM CHLORIDE 0.9 % (FLUSH) 0.9 %
5-40 SYRINGE (ML) INJECTION EVERY 12 HOURS SCHEDULED
Status: DISCONTINUED | OUTPATIENT
Start: 2022-10-11 | End: 2022-10-11 | Stop reason: HOSPADM

## 2022-10-11 RX ORDER — METRONIDAZOLE 500 MG/100ML
500 INJECTION, SOLUTION INTRAVENOUS EVERY 8 HOURS
Status: COMPLETED | OUTPATIENT
Start: 2022-10-11 | End: 2022-10-12

## 2022-10-11 RX ORDER — FENTANYL CITRATE 50 UG/ML
100 INJECTION, SOLUTION INTRAMUSCULAR; INTRAVENOUS
Status: DISCONTINUED | OUTPATIENT
Start: 2022-10-11 | End: 2022-10-11 | Stop reason: HOSPADM

## 2022-10-11 RX ORDER — SODIUM CHLORIDE, SODIUM LACTATE, POTASSIUM CHLORIDE, CALCIUM CHLORIDE 600; 310; 30; 20 MG/100ML; MG/100ML; MG/100ML; MG/100ML
INJECTION, SOLUTION INTRAVENOUS CONTINUOUS
Status: DISCONTINUED | OUTPATIENT
Start: 2022-10-11 | End: 2022-10-11 | Stop reason: HOSPADM

## 2022-10-11 RX ORDER — ROCURONIUM BROMIDE 10 MG/ML
INJECTION, SOLUTION INTRAVENOUS PRN
Status: DISCONTINUED | OUTPATIENT
Start: 2022-10-11 | End: 2022-10-11 | Stop reason: SDUPTHER

## 2022-10-11 RX ORDER — VECURONIUM BROMIDE 1 MG/ML
INJECTION, POWDER, LYOPHILIZED, FOR SOLUTION INTRAVENOUS PRN
Status: DISCONTINUED | OUTPATIENT
Start: 2022-10-11 | End: 2022-10-11 | Stop reason: SDUPTHER

## 2022-10-11 RX ORDER — MIDAZOLAM HYDROCHLORIDE 2 MG/2ML
2 INJECTION, SOLUTION INTRAMUSCULAR; INTRAVENOUS
Status: DISCONTINUED | OUTPATIENT
Start: 2022-10-11 | End: 2022-10-11 | Stop reason: HOSPADM

## 2022-10-11 RX ORDER — IBUPROFEN 600 MG/1
600 TABLET ORAL EVERY 8 HOURS
Status: DISCONTINUED | OUTPATIENT
Start: 2022-10-11 | End: 2022-10-12 | Stop reason: HOSPADM

## 2022-10-11 RX ORDER — GLYCOPYRROLATE 0.2 MG/ML
INJECTION INTRAMUSCULAR; INTRAVENOUS PRN
Status: DISCONTINUED | OUTPATIENT
Start: 2022-10-11 | End: 2022-10-11 | Stop reason: SDUPTHER

## 2022-10-11 RX ORDER — KETAMINE HYDROCHLORIDE 50 MG/ML
INJECTION, SOLUTION, CONCENTRATE INTRAMUSCULAR; INTRAVENOUS PRN
Status: DISCONTINUED | OUTPATIENT
Start: 2022-10-11 | End: 2022-10-11 | Stop reason: SDUPTHER

## 2022-10-11 RX ORDER — OXYCODONE HYDROCHLORIDE 5 MG/1
5 TABLET ORAL EVERY 4 HOURS PRN
Status: DISCONTINUED | OUTPATIENT
Start: 2022-10-11 | End: 2022-10-12 | Stop reason: HOSPADM

## 2022-10-11 RX ORDER — ROPIVACAINE HYDROCHLORIDE 5 MG/ML
INJECTION, SOLUTION EPIDURAL; INFILTRATION; PERINEURAL
Status: DISCONTINUED | OUTPATIENT
Start: 2022-10-11 | End: 2022-10-11 | Stop reason: SDUPTHER

## 2022-10-11 RX ORDER — MORPHINE SULFATE 2 MG/ML
2 INJECTION, SOLUTION INTRAMUSCULAR; INTRAVENOUS
Status: DISCONTINUED | OUTPATIENT
Start: 2022-10-11 | End: 2022-10-12 | Stop reason: HOSPADM

## 2022-10-11 RX ORDER — SODIUM CHLORIDE 9 MG/ML
INJECTION, SOLUTION INTRAVENOUS PRN
Status: DISCONTINUED | OUTPATIENT
Start: 2022-10-11 | End: 2022-10-12 | Stop reason: HOSPADM

## 2022-10-11 RX ORDER — FAMOTIDINE 20 MG/1
20 TABLET, FILM COATED ORAL 2 TIMES DAILY
Status: DISCONTINUED | OUTPATIENT
Start: 2022-10-11 | End: 2022-10-12 | Stop reason: HOSPADM

## 2022-10-11 RX ORDER — OXYCODONE HYDROCHLORIDE 5 MG/1
10 TABLET ORAL EVERY 4 HOURS PRN
Status: DISCONTINUED | OUTPATIENT
Start: 2022-10-11 | End: 2022-10-12 | Stop reason: HOSPADM

## 2022-10-11 RX ORDER — FENTANYL CITRATE 50 UG/ML
25 INJECTION, SOLUTION INTRAMUSCULAR; INTRAVENOUS
Status: DISCONTINUED | OUTPATIENT
Start: 2022-10-11 | End: 2022-10-11 | Stop reason: HOSPADM

## 2022-10-11 RX ORDER — HYDROMORPHONE HYDROCHLORIDE 2 MG/ML
0.25 INJECTION, SOLUTION INTRAMUSCULAR; INTRAVENOUS; SUBCUTANEOUS EVERY 5 MIN PRN
Status: DISCONTINUED | OUTPATIENT
Start: 2022-10-11 | End: 2022-10-11 | Stop reason: HOSPADM

## 2022-10-11 RX ORDER — OXYCODONE HYDROCHLORIDE 5 MG/1
5 TABLET ORAL
Status: COMPLETED | OUTPATIENT
Start: 2022-10-11 | End: 2022-10-11

## 2022-10-11 RX ORDER — SENNA AND DOCUSATE SODIUM 50; 8.6 MG/1; MG/1
2 TABLET, FILM COATED ORAL DAILY PRN
Status: DISCONTINUED | OUTPATIENT
Start: 2022-10-11 | End: 2022-10-12 | Stop reason: HOSPADM

## 2022-10-11 RX ORDER — ONDANSETRON 4 MG/1
4 TABLET, ORALLY DISINTEGRATING ORAL EVERY 8 HOURS PRN
Status: DISCONTINUED | OUTPATIENT
Start: 2022-10-11 | End: 2022-10-11 | Stop reason: SDUPTHER

## 2022-10-11 RX ORDER — SODIUM CHLORIDE 0.9 % (FLUSH) 0.9 %
5-40 SYRINGE (ML) INJECTION PRN
Status: DISCONTINUED | OUTPATIENT
Start: 2022-10-11 | End: 2022-10-12 | Stop reason: HOSPADM

## 2022-10-11 RX ORDER — MAGNESIUM HYDROXIDE/ALUMINUM HYDROXICE/SIMETHICONE 120; 1200; 1200 MG/30ML; MG/30ML; MG/30ML
15 SUSPENSION ORAL EVERY 6 HOURS PRN
Status: DISCONTINUED | OUTPATIENT
Start: 2022-10-11 | End: 2022-10-12 | Stop reason: HOSPADM

## 2022-10-11 RX ORDER — SODIUM CHLORIDE 9 MG/ML
INJECTION, SOLUTION INTRAVENOUS CONTINUOUS
Status: DISCONTINUED | OUTPATIENT
Start: 2022-10-11 | End: 2022-10-11 | Stop reason: HOSPADM

## 2022-10-11 RX ORDER — NEOSTIGMINE METHYLSULFATE 1 MG/ML
INJECTION, SOLUTION INTRAVENOUS PRN
Status: DISCONTINUED | OUTPATIENT
Start: 2022-10-11 | End: 2022-10-11 | Stop reason: SDUPTHER

## 2022-10-11 RX ORDER — SUCCINYLCHOLINE CHLORIDE 20 MG/ML
INJECTION INTRAMUSCULAR; INTRAVENOUS PRN
Status: DISCONTINUED | OUTPATIENT
Start: 2022-10-11 | End: 2022-10-11 | Stop reason: SDUPTHER

## 2022-10-11 RX ORDER — 0.9 % SODIUM CHLORIDE 0.9 %
1000 INTRAVENOUS SOLUTION INTRAVENOUS ONCE
Status: COMPLETED | OUTPATIENT
Start: 2022-10-11 | End: 2022-10-11

## 2022-10-11 RX ORDER — PROPOFOL 10 MG/ML
INJECTION, EMULSION INTRAVENOUS PRN
Status: DISCONTINUED | OUTPATIENT
Start: 2022-10-11 | End: 2022-10-11 | Stop reason: SDUPTHER

## 2022-10-11 RX ORDER — HYDROMORPHONE HYDROCHLORIDE 2 MG/ML
0.5 INJECTION, SOLUTION INTRAMUSCULAR; INTRAVENOUS; SUBCUTANEOUS EVERY 5 MIN PRN
Status: COMPLETED | OUTPATIENT
Start: 2022-10-11 | End: 2022-10-11

## 2022-10-11 RX ORDER — PROCHLORPERAZINE MALEATE 10 MG
5 TABLET ORAL EVERY 6 HOURS PRN
Status: DISCONTINUED | OUTPATIENT
Start: 2022-10-11 | End: 2022-10-12 | Stop reason: HOSPADM

## 2022-10-11 RX ORDER — LIDOCAINE HYDROCHLORIDE 10 MG/ML
1 INJECTION, SOLUTION INFILTRATION; PERINEURAL
Status: DISCONTINUED | OUTPATIENT
Start: 2022-10-11 | End: 2022-10-11 | Stop reason: HOSPADM

## 2022-10-11 RX ORDER — ONDANSETRON 4 MG/1
4 TABLET, ORALLY DISINTEGRATING ORAL EVERY 8 HOURS PRN
Status: DISCONTINUED | OUTPATIENT
Start: 2022-10-11 | End: 2022-10-12 | Stop reason: HOSPADM

## 2022-10-11 RX ORDER — METOCLOPRAMIDE 10 MG/1
5 TABLET ORAL
Status: DISCONTINUED | OUTPATIENT
Start: 2022-10-11 | End: 2022-10-12 | Stop reason: HOSPADM

## 2022-10-11 RX ORDER — DEXAMETHASONE SODIUM PHOSPHATE 10 MG/ML
INJECTION INTRAMUSCULAR; INTRAVENOUS PRN
Status: DISCONTINUED | OUTPATIENT
Start: 2022-10-11 | End: 2022-10-11 | Stop reason: SDUPTHER

## 2022-10-11 RX ORDER — LIDOCAINE HYDROCHLORIDE ANHYDROUS AND DEXTROSE MONOHYDRATE .4; 5 G/100ML; G/100ML
INJECTION, SOLUTION INTRAVENOUS CONTINUOUS PRN
Status: DISCONTINUED | OUTPATIENT
Start: 2022-10-11 | End: 2022-10-11 | Stop reason: SDUPTHER

## 2022-10-11 RX ORDER — HYDROMORPHONE HCL 110MG/55ML
PATIENT CONTROLLED ANALGESIA SYRINGE INTRAVENOUS PRN
Status: DISCONTINUED | OUTPATIENT
Start: 2022-10-11 | End: 2022-10-11 | Stop reason: SDUPTHER

## 2022-10-11 RX ORDER — DIPHENHYDRAMINE HYDROCHLORIDE 50 MG/ML
6.25 INJECTION INTRAMUSCULAR; INTRAVENOUS
Status: DISCONTINUED | OUTPATIENT
Start: 2022-10-11 | End: 2022-10-11 | Stop reason: HOSPADM

## 2022-10-11 RX ORDER — SODIUM CHLORIDE 0.9 % (FLUSH) 0.9 %
5-40 SYRINGE (ML) INJECTION EVERY 12 HOURS SCHEDULED
Status: DISCONTINUED | OUTPATIENT
Start: 2022-10-11 | End: 2022-10-12 | Stop reason: HOSPADM

## 2022-10-11 RX ADMIN — ROPIVACAINE HYDROCHLORIDE 20 ML: 5 INJECTION, SOLUTION EPIDURAL; INFILTRATION; PERINEURAL at 16:43

## 2022-10-11 RX ADMIN — HYDROMORPHONE HYDROCHLORIDE 0.5 MG: 2 INJECTION, SOLUTION INTRAMUSCULAR; INTRAVENOUS; SUBCUTANEOUS at 17:15

## 2022-10-11 RX ADMIN — FAMOTIDINE 20 MG: 10 INJECTION, SOLUTION INTRAVENOUS at 20:46

## 2022-10-11 RX ADMIN — KETOROLAC TROMETHAMINE 15 MG: 30 INJECTION, SOLUTION INTRAMUSCULAR; INTRAVENOUS at 16:19

## 2022-10-11 RX ADMIN — DEXAMETHASONE SODIUM PHOSPHATE 10 MG: 10 INJECTION INTRAMUSCULAR; INTRAVENOUS at 14:45

## 2022-10-11 RX ADMIN — Medication 160 MG: at 14:36

## 2022-10-11 RX ADMIN — KETAMINE HYDROCHLORIDE 30 MG: 50 INJECTION, SOLUTION INTRAMUSCULAR; INTRAVENOUS at 15:56

## 2022-10-11 RX ADMIN — SODIUM CHLORIDE 1000 ML: 9 INJECTION, SOLUTION INTRAVENOUS at 18:56

## 2022-10-11 RX ADMIN — KETAMINE HYDROCHLORIDE 20 MG: 50 INJECTION, SOLUTION INTRAMUSCULAR; INTRAVENOUS at 15:36

## 2022-10-11 RX ADMIN — HYDROMORPHONE HYDROCHLORIDE 0.5 MG: 2 INJECTION INTRAMUSCULAR; INTRAVENOUS; SUBCUTANEOUS at 15:15

## 2022-10-11 RX ADMIN — SODIUM CHLORIDE, POTASSIUM CHLORIDE, SODIUM LACTATE AND CALCIUM CHLORIDE: 600; 310; 30; 20 INJECTION, SOLUTION INTRAVENOUS at 12:42

## 2022-10-11 RX ADMIN — Medication 3 MG: at 16:40

## 2022-10-11 RX ADMIN — HYDROMORPHONE HYDROCHLORIDE 0.5 MG: 2 INJECTION, SOLUTION INTRAMUSCULAR; INTRAVENOUS; SUBCUTANEOUS at 16:58

## 2022-10-11 RX ADMIN — SODIUM CHLORIDE, PRESERVATIVE FREE 10 ML: 5 INJECTION INTRAVENOUS at 20:47

## 2022-10-11 RX ADMIN — LIDOCAINE HYDROCHLORIDE 2 MG/MIN: 4 INJECTION, SOLUTION INTRAVENOUS at 14:45

## 2022-10-11 RX ADMIN — METOCLOPRAMIDE 5 MG: 10 TABLET ORAL at 19:21

## 2022-10-11 RX ADMIN — GLYCOPYRROLATE 0.4 MG: 0.2 INJECTION, SOLUTION INTRAMUSCULAR; INTRAVENOUS at 16:40

## 2022-10-11 RX ADMIN — HYDROMORPHONE HYDROCHLORIDE 0.5 MG: 2 INJECTION INTRAMUSCULAR; INTRAVENOUS; SUBCUTANEOUS at 14:33

## 2022-10-11 RX ADMIN — Medication 2 G: at 14:45

## 2022-10-11 RX ADMIN — HYDROMORPHONE HYDROCHLORIDE 0.5 MG: 2 INJECTION, SOLUTION INTRAMUSCULAR; INTRAVENOUS; SUBCUTANEOUS at 17:05

## 2022-10-11 RX ADMIN — ONDANSETRON 4 MG: 2 INJECTION INTRAMUSCULAR; INTRAVENOUS at 14:45

## 2022-10-11 RX ADMIN — ROCURONIUM BROMIDE 5 MG: 50 INJECTION, SOLUTION INTRAVENOUS at 14:36

## 2022-10-11 RX ADMIN — VECURONIUM BROMIDE 1 MG: 1 INJECTION, POWDER, LYOPHILIZED, FOR SOLUTION INTRAVENOUS at 15:36

## 2022-10-11 RX ADMIN — OXYCODONE 5 MG: 5 TABLET ORAL at 17:52

## 2022-10-11 RX ADMIN — PROPOFOL 200 MG: 10 INJECTION, EMULSION INTRAVENOUS at 14:36

## 2022-10-11 RX ADMIN — HYDROMORPHONE HYDROCHLORIDE 0.5 MG: 2 INJECTION, SOLUTION INTRAMUSCULAR; INTRAVENOUS; SUBCUTANEOUS at 17:10

## 2022-10-11 RX ADMIN — IBUPROFEN 600 MG: 600 TABLET ORAL at 19:21

## 2022-10-11 RX ADMIN — HEPARIN SODIUM 5000 UNITS: 5000 INJECTION INTRAVENOUS; SUBCUTANEOUS at 12:42

## 2022-10-11 RX ADMIN — CEFAZOLIN SODIUM 2000 MG: 100 INJECTION, POWDER, LYOPHILIZED, FOR SOLUTION INTRAVENOUS at 21:41

## 2022-10-11 RX ADMIN — ROPIVACAINE HYDROCHLORIDE 20 ML: 5 INJECTION, SOLUTION EPIDURAL; INFILTRATION; PERINEURAL at 16:38

## 2022-10-11 RX ADMIN — KETAMINE HYDROCHLORIDE 20 MG: 50 INJECTION, SOLUTION INTRAMUSCULAR; INTRAVENOUS at 14:36

## 2022-10-11 RX ADMIN — METRONIDAZOLE 500 MG: 500 INJECTION, SOLUTION INTRAVENOUS at 20:47

## 2022-10-11 RX ADMIN — HYDROMORPHONE HYDROCHLORIDE 1 MG: 2 INJECTION INTRAMUSCULAR; INTRAVENOUS; SUBCUTANEOUS at 15:09

## 2022-10-11 RX ADMIN — VECURONIUM BROMIDE 6 MG: 1 INJECTION, POWDER, LYOPHILIZED, FOR SOLUTION INTRAVENOUS at 14:48

## 2022-10-11 ASSESSMENT — PAIN - FUNCTIONAL ASSESSMENT: PAIN_FUNCTIONAL_ASSESSMENT: 0-10

## 2022-10-11 ASSESSMENT — PAIN SCALES - GENERAL
PAINLEVEL_OUTOF10: 7
PAINLEVEL_OUTOF10: 0
PAINLEVEL_OUTOF10: 2
PAINLEVEL_OUTOF10: 7
PAINLEVEL_OUTOF10: 5
PAINLEVEL_OUTOF10: 4

## 2022-10-11 ASSESSMENT — LIFESTYLE VARIABLES: SMOKING_STATUS: 1

## 2022-10-11 ASSESSMENT — PAIN DESCRIPTION - DESCRIPTORS: DESCRIPTORS: ACHING

## 2022-10-11 ASSESSMENT — PAIN DESCRIPTION - LOCATION
LOCATION: ABDOMEN

## 2022-10-11 NOTE — BRIEF OP NOTE
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       Procedure(s):  TOTAL ABDOMINAL HYSTERECTOMY BILATERAL SALPINGO OOPHERECTOMY AND POSSIBLE STAGING  CYSTOSCOPY URETERAL STENT INSERTION    Surgeon(s):  Will Huynh MD    Assistant:  * No surgical staff found *    Anesthesia: General    Estimated Blood Loss (mL): less than 50     Complications: None    Specimens:   ID Type Source Tests Collected by Time Destination   A : pelvic washings Body Fluid Pelvic Washings CYTOLOGY, NON-GYN Will Huynh MD 10/11/2022 1515    B : Left tube and ovary Tissue Abdomen SURGICAL PATHOLOGY Will Huynh MD 10/11/2022 1518    C : OMENTUM Tissue Omentum SURGICAL PATHOLOGY Will Huynh MD 10/11/2022 1534    D : UTERUS LEFT PROXIMAL IP, RIGHT TUBE OVARY  Tissue Abdomen SURGICAL PATHOLOGY Will Huynh MD 10/11/2022 1545    E : LEFT GUTTER  Tissue Abdomen SURGICAL PATHOLOGY Will Huynh MD 10/11/2022 1556    F : RIGHT GUTTER Tissue Abdomen SURGICAL PATHOLOGY Will Huynh MD 10/11/2022 1556    G : LEFT PELVIC LYMPH NODE  Tissue Lymph Node SURGICAL PATHOLOGY Will Huynh MD 10/11/2022 1558        Implants:  * No implants in log *      Drains:   Urinary Catheter 10/11/22 2 Way; Saul (Active)       [REMOVED] Ureteral Drain/Stent 10/11/22 Left Ureter (Removed)       [REMOVED] Ureteral Drain/Stent 10/11/22 Right Ureter (Removed)       Findings: large left ovary neoplasm removed intact, borderline on frozen section    Electronically signed by Will Huynh MD on 10/11/2022 at 4:25 PM

## 2022-10-11 NOTE — ANESTHESIA PROCEDURE NOTES
Airway  Date/Time: 10/11/2022 2:53 PM  Urgency: elective    Airway not difficult    General Information and Staff    Patient location during procedure: OR  Performed: resident/CRNA     Indications and Patient Condition  Indications for airway management: anesthesia  Spontaneous Ventilation: absent  Sedation level: deep  Preoxygenated: yes  Patient position: sniffing  MILS not maintained throughout  Mask difficulty assessment: not attempted    Final Airway Details  Final airway type: endotracheal airway      Successful airway: ETT  Cuffed: yes   Successful intubation technique: direct laryngoscopy  Facilitating devices/methods: intubating stylet  Endotracheal tube insertion site: oral  Blade: Charles  Blade size: #3  ETT size (mm): 7.0  Cormack-Lehane Classification: grade I - full view of glottis  Placement verified by: chest auscultation and capnometry   Measured from: lips  Number of attempts at approach: 1  Ventilation between attempts: bag mask    Additional Comments  Treated as a RSI due to ongoing nausea thru out the day

## 2022-10-11 NOTE — PERIOP NOTE
TRANSFER - OUT REPORT:    Verbal report given to ASHLEY gallegos on Christal Geiger  being transferred to [facility] for routine progression of patient care       Report consisted of patients Situation, Background, Assessment and   Recommendations(SBAR). Information from the following report(s) Nurse Handoff Report, Adult Overview, Surgery Report, and Cardiac Rhythm NSR  was reviewed with the receiving nurse. Lines:   Peripheral IV 10/11/22 Right Hand (Active)   Site Assessment Clean, dry & intact 10/11/22 1655   Line Status Infusing 10/11/22 [address] Connections checked and tightened 10/11/22 1655   Phlebitis Assessment No symptoms 10/11/22 1655   Infiltration Assessment 0 10/11/22 1655   Alcohol Cap Used No 10/11/22 1655   Dressing Status Clean, dry & intact 10/11/22 1655   Dressing Type Transparent 10/11/22 1655       Peripheral IV 10/11/22 Left Hand (Active)   Site Assessment Clean, dry & intact 10/11/22 1655   Line Status Flushed 10/11/22 [address] Connections checked and tightened 10/11/22 1655   Phlebitis Assessment No symptoms 10/11/22 1655   Infiltration Assessment 0 10/11/22 1655   Alcohol Cap Used No 10/11/22 1655   Dressing Status Clean, dry & intact 10/11/22 1655   Dressing Type Transparent 10/11/22 1655        Opportunity for questions and clarification was provided. Patient transported with:   O2 @ 2 liters    VTE prophylaxis orders have been written for Christal Geiger. Patient and family given floor number and nurses name. Family updated re: pt status after security code verified.

## 2022-10-11 NOTE — ANESTHESIA PRE PROCEDURE
Department of Anesthesiology  Preprocedure Note       Name:  Adelia Muhammad   Age:  60 y.o.  :  1960                                          MRN:  [id number]         Date:  10/11/2022      Surgeon: Raymundo Zheng):  Lakeshia Macdonald MD    Procedure: Procedure(s):  TOTAL ABDOMINAL HYSTERECTOMY BILATERAL SALPINGO OOPHERECTOMY AND POSSIBLE STAGING  CYSTOSCOPY URETERAL STENT INSERTION    Medications prior to admission:   Prior to Admission medications    Medication Sig Start Date End Date Taking? Authorizing Provider   ondansetron (ZOFRAN ODT) 4 MG disintegrating tablet Take 1 tablet by mouth every 8 hours as needed for Nausea or Vomiting 10/7/22   Lakeshia Macdonald MD   prochlorperazine (COMPAZINE) 5 MG tablet Take 1 tablet by mouth every 6 hours as needed for Nausea 10/7/22   Lakeshia Macdonald MD   HYDROcodone-acetaminophen [facility])  MG per tablet Take 1 tablet by mouth every 6 hours as needed for Pain for up to 14 days.  Intended supply: 30 days 10/7/22 10/21/22  Lakeshia Macdonald MD       Current medications:    Current Facility-Administered Medications   Medication Dose Route Frequency Provider Last Rate Last Admin    lidocaine 1 % injection 1 mL  1 mL IntraDERmal Once PRN Adarsh Vitale MD        fentaNYL (SUBLIMAZE) injection 25 mcg  25 mcg IntraVENous Once PRN Adarsh Vitale MD        Or    fentaNYL (SUBLIMAZE) injection 100 mcg  100 mcg IntraVENous Once PRN Adarsh Vitale MD        0.9 % sodium chloride infusion   IntraVENous Continuous Adarsh Vitale MD        lactated ringers infusion   IntraVENous Continuous Adarsh Vitale  mL/hr at 10/11/22 1242 New Bag at 10/11/22 1242    sodium chloride flush 0.9 % injection 5-40 mL  5-40 mL IntraVENous 2 times per day Adarsh Vitale MD        sodium chloride flush 0.9 % injection 5-40 mL  5-40 mL IntraVENous PRN Adarsh Vitale MD        0.9 % sodium chloride infusion   IntraVENous PRN Adarsh Vitale MD        midazolam PF (VERSED) injection 2 mg  2 mg IntraVENous Once PRN Jessie Parker MD        sodium chloride flush 0.9 % injection 5-40 mL  5-40 mL IntraVENous 2 times per day Tony Giordano MD        sodium chloride flush 0.9 % injection 5-40 mL  5-40 mL IntraVENous PRN Tony Giordano MD        0.9 % sodium chloride infusion   IntraVENous PRN Tony Giordano MD        ceFAZolin (ANCEF) 2000 mg in sterile water 20 mL IV syringe  2,000 mg IntraVENous On Call to [facility], [place]           Allergies:  No Known Allergies    Problem List:    Patient Active Problem List   Diagnosis Code    Intramural leiomyoma of uterus D25.1    Abdominal mass R19.00       Past Medical History:  History reviewed. No pertinent past medical history.     Past Surgical History:        Procedure Laterality Date    COLONOSCOPY      HERNIA REPAIR      TONSILLECTOMY         Social History:    Social History     Tobacco Use    Smoking status: Former     Types: Cigarettes     Quit date:      Years since quittin.    Smokeless tobacco: Never   Substance Use Topics    Alcohol use: Yes     Comment: twice yearly glass wine                                Counseling given: Not Answered      Vital Signs (Current):   Vitals:    10/07/22 1343 10/11/22 1213   BP:  (!) 160/85   Pulse:  (!) 110   Resp:  12   Temp:  98.6 °F (37 °C)   TempSrc:  Oral   SpO2:  97%   Weight: 204 lb (92.5 kg) 203 lb 6.4 oz (92.3 kg)   Height: 5' 4\" (1.626 m) 5' 4\" (1.626 m)                                              BP Readings from Last 3 Encounters:   10/11/22 (!) 160/85   10/07/22 (!) 153/93   10/03/22 128/78       NPO Status: Time of last liquid consumption: 0000                        Time of last solid consumption: 0000                        Date of last liquid consumption: 10/10/22                        Date of last solid food consumption: 10/10/22    BMI:   Wt Readings from Last 3 Encounters:   10/11/22 203 lb 6.4 oz (92.3 kg)   10/07/22 204 lb 8 oz (92.8 kg)   10/03/22 207 lb (93.9 kg)     Body mass index is 34.91 kg/m². CBC:   Lab Results   Component Value Date/Time    WBC 8.7 09/26/2022 10:13 AM    RBC 4.94 09/26/2022 10:13 AM    HGB 13.8 09/26/2022 10:13 AM    HCT 43.7 09/26/2022 10:13 AM    MCV 88.5 09/26/2022 10:13 AM    RDW 13.2 09/26/2022 10:13 AM     09/26/2022 10:13 AM       CMP:   Lab Results   Component Value Date/Time     09/26/2022 10:13 AM    K 3.8 09/26/2022 10:13 AM     09/26/2022 10:13 AM    CO2 22 09/26/2022 10:13 AM    BUN 12 09/26/2022 10:13 AM    CREATININE 0.65 09/26/2022 10:13 AM    GFRAA >60 09/26/2022 10:13 AM    LABGLOM >60 09/26/2022 10:13 AM    GLUCOSE 69 09/26/2022 10:13 AM    PROT 8.2 09/26/2022 10:13 AM    CALCIUM 10.2 09/26/2022 10:13 AM    BILITOT 0.8 09/26/2022 10:13 AM    ALKPHOS 90 09/26/2022 10:13 AM    AST 18 09/26/2022 10:13 AM    ALT 19 09/26/2022 10:13 AM       POC Tests: No results for input(s): POCGLU, POCNA, POCK, POCCL, POCBUN, POCHEMO, POCHCT in the last 72 hours. Coags: No results found for: PROTIME, INR, APTT    HCG (If Applicable): No results found for: PREGTESTUR, PREGSERUM, HCG, HCGQUANT     ABGs: No results found for: PHART, PO2ART, IGO7IXF, TZG0NCO, BEART, N9LKKXDC     Type & Screen (If Applicable):  No results found for: LABABO, LABRH    Drug/Infectious Status (If Applicable):  No results found for: HIV, HEPCAB    COVID-19 Screening (If Applicable): No results found for: COVID19        Anesthesia Evaluation  Patient summary reviewed  Airway: Mallampati: II  TM distance: >3 FB   Neck ROM: limited  Mouth opening: > = 3 FB   Dental:          Pulmonary: breath sounds clear to auscultation  (+) current smoker (Former)                           Cardiovascular:  Exercise tolerance: good (>4 METS),                     Neuro/Psych:               GI/Hepatic/Renal:             Endo/Other:                     Abdominal:             Vascular:           Other Findings:           Anesthesia Plan      general     ASA 2 Anesthetic plan and risks discussed with patient and spouse.               Post-op pain plan if not by surgeon: single peripheral nerve block            Luisana Rodriguez MD   10/11/2022

## 2022-10-12 VITALS
HEART RATE: 66 BPM | DIASTOLIC BLOOD PRESSURE: 53 MMHG | OXYGEN SATURATION: 94 % | WEIGHT: 204 LBS | RESPIRATION RATE: 18 BRPM | BODY MASS INDEX: 34.83 KG/M2 | TEMPERATURE: 98.1 F | SYSTOLIC BLOOD PRESSURE: 102 MMHG | HEIGHT: 64 IN

## 2022-10-12 LAB
ANION GAP SERPL CALC-SCNC: 5 MMOL/L (ref 2–11)
BASOPHILS # BLD: 0 K/UL (ref 0–0.2)
BASOPHILS NFR BLD: 0 % (ref 0–2)
BUN SERPL-MCNC: 8 MG/DL (ref 8–23)
CALCIUM SERPL-MCNC: 8.4 MG/DL (ref 8.3–10.4)
CHLORIDE SERPL-SCNC: 107 MMOL/L (ref 101–110)
CO2 SERPL-SCNC: 24 MMOL/L (ref 21–32)
CREAT SERPL-MCNC: 0.5 MG/DL (ref 0.6–1)
DIFFERENTIAL METHOD BLD: ABNORMAL
EOSINOPHIL # BLD: 0 K/UL (ref 0–0.8)
EOSINOPHIL NFR BLD: 0 % (ref 0.5–7.8)
ERYTHROCYTE [DISTWIDTH] IN BLOOD BY AUTOMATED COUNT: 13.2 % (ref 11.9–14.6)
GLUCOSE SERPL-MCNC: 120 MG/DL (ref 65–100)
HCT VFR BLD AUTO: 36.1 % (ref 35.8–46.3)
HGB BLD-MCNC: 11.2 G/DL (ref 11.7–15.4)
IMM GRANULOCYTES # BLD AUTO: 0.1 K/UL (ref 0–0.5)
IMM GRANULOCYTES NFR BLD AUTO: 1 % (ref 0–5)
LYMPHOCYTES # BLD: 1.3 K/UL (ref 0.5–4.6)
LYMPHOCYTES NFR BLD: 10 % (ref 13–44)
MCH RBC QN AUTO: 27.9 PG (ref 26.1–32.9)
MCHC RBC AUTO-ENTMCNC: 31 G/DL (ref 31.4–35)
MCV RBC AUTO: 89.8 FL (ref 82–102)
MONOCYTES # BLD: 1 K/UL (ref 0.1–1.3)
MONOCYTES NFR BLD: 8 % (ref 4–12)
NEUTS SEG # BLD: 10.4 K/UL (ref 1.7–8.2)
NEUTS SEG NFR BLD: 81 % (ref 43–78)
NRBC # BLD: 0 K/UL (ref 0–0.2)
PLATELET # BLD AUTO: 408 K/UL (ref 150–450)
PMV BLD AUTO: 9.2 FL (ref 9.4–12.3)
POTASSIUM SERPL-SCNC: 4 MMOL/L (ref 3.5–5.1)
RBC # BLD AUTO: 4.02 M/UL (ref 4.05–5.2)
SODIUM SERPL-SCNC: 136 MMOL/L (ref 133–143)
WBC # BLD AUTO: 12.8 K/UL (ref 4.3–11.1)

## 2022-10-12 PROCEDURE — 6370000000 HC RX 637 (ALT 250 FOR IP): Performed by: OBSTETRICS & GYNECOLOGY

## 2022-10-12 PROCEDURE — 85025 COMPLETE CBC W/AUTO DIFF WBC: CPT

## 2022-10-12 PROCEDURE — 2500000003 HC RX 250 WO HCPCS: Performed by: OBSTETRICS & GYNECOLOGY

## 2022-10-12 PROCEDURE — 6360000002 HC RX W HCPCS: Performed by: OBSTETRICS & GYNECOLOGY

## 2022-10-12 PROCEDURE — 36415 COLL VENOUS BLD VENIPUNCTURE: CPT

## 2022-10-12 PROCEDURE — 80048 BASIC METABOLIC PNL TOTAL CA: CPT

## 2022-10-12 PROCEDURE — 2580000003 HC RX 258: Performed by: OBSTETRICS & GYNECOLOGY

## 2022-10-12 RX ORDER — SENNA AND DOCUSATE SODIUM 50; 8.6 MG/1; MG/1
2 TABLET, FILM COATED ORAL DAILY PRN
Qty: 30 TABLET | Refills: 0 | Status: SHIPPED | OUTPATIENT
Start: 2022-10-12

## 2022-10-12 RX ORDER — METOCLOPRAMIDE 5 MG/1
5 TABLET ORAL
Qty: 30 TABLET | Refills: 0 | Status: SHIPPED | OUTPATIENT
Start: 2022-10-12 | End: 2022-10-22

## 2022-10-12 RX ORDER — IBUPROFEN 600 MG/1
600 TABLET ORAL EVERY 8 HOURS
Qty: 120 TABLET | Refills: 3 | Status: SHIPPED | OUTPATIENT
Start: 2022-10-12 | End: 2022-10-15

## 2022-10-12 RX ADMIN — IBUPROFEN 600 MG: 600 TABLET ORAL at 10:21

## 2022-10-12 RX ADMIN — METOCLOPRAMIDE 5 MG: 10 TABLET ORAL at 10:21

## 2022-10-12 RX ADMIN — METOCLOPRAMIDE 5 MG: 10 TABLET ORAL at 05:58

## 2022-10-12 RX ADMIN — HYDROCODONE BITARTRATE AND ACETAMINOPHEN 1 TABLET: 10; 325 TABLET ORAL at 00:53

## 2022-10-12 RX ADMIN — SODIUM CHLORIDE, PRESERVATIVE FREE 10 ML: 5 INJECTION INTRAVENOUS at 09:20

## 2022-10-12 RX ADMIN — CEFAZOLIN SODIUM 2000 MG: 100 INJECTION, POWDER, LYOPHILIZED, FOR SOLUTION INTRAVENOUS at 05:58

## 2022-10-12 RX ADMIN — METRONIDAZOLE 500 MG: 500 INJECTION, SOLUTION INTRAVENOUS at 05:58

## 2022-10-12 RX ADMIN — IBUPROFEN 600 MG: 600 TABLET ORAL at 03:42

## 2022-10-12 RX ADMIN — FAMOTIDINE 20 MG: 20 TABLET, FILM COATED ORAL at 09:20

## 2022-10-12 ASSESSMENT — PAIN DESCRIPTION - LOCATION
LOCATION: ABDOMEN
LOCATION: ABDOMEN

## 2022-10-12 ASSESSMENT — PAIN DESCRIPTION - ORIENTATION: ORIENTATION: MID

## 2022-10-12 ASSESSMENT — PAIN SCALES - GENERAL
PAINLEVEL_OUTOF10: 2
PAINLEVEL_OUTOF10: 7
PAINLEVEL_OUTOF10: 2

## 2022-10-12 ASSESSMENT — PAIN DESCRIPTION - DESCRIPTORS: DESCRIPTORS: ACHING;BURNING

## 2022-10-12 NOTE — PROGRESS NOTES
Physician Progress Note      PATIENT:               Uma Fleming  CSN #:                  [id number]  :                       1960  ADMIT DATE:       10/11/2022 11:31 AM  [address] [place] DATE:    PROVIDER #:        Rita Amador MD          QUERY TEXT:    Pt admitted with Pelvic mass and elevated CA-125. Pt noted to have a drop in   hemoglobin. If possible, please document in the progress notes and discharge   summary if you are evaluating and/or treating any of the following: The medical record reflects the following:  Risk Factors: This is a patient who was referred after a large mass was noted,   followed by badly elevated CA-125  Clinical Indicators: Procedure 10/11: Cystoscopy with intraoperative ureteral   catheters, Total abdominal hysterectomy, bilateral salpingo-oophorectomy,   Infracolic omentectomy, Left pelvic lymph node sampling, Peritoneal biopsies  EBL: 100ml, Hgb 10/11 13.9, 10/12 11.2  Treatment: Serial labs    Thank you,  Hope Pendleton RN, BSN, [email or web address]  . Options provided:  -- Acute blood loss anemia  -- Chronic blood loss anemia  -- Acute on chronic blood loss anemia  -- Iron deficiency anemia  -- Postoperative acute blood loss anemia  -- Dilutional anemia  -- Other - I will add my own diagnosis  -- Disagree - Not applicable / Not valid  -- Disagree - Clinically unable to determine / Unknown  -- Refer to Clinical Documentation Reviewer    PROVIDER RESPONSE TEXT:    Not evaluaiting form acute blood, labs as expected.     Query created by: Alexa Zayas on 10/12/2022 9:12 AM      Electronically signed by:  Rita Amador MD 10/12/2022 11:21 AM

## 2022-10-12 NOTE — CARE COORDINATION
Pt to d/c home today. Spouse is at the bedside and will provide transportation home. There were no PT/OT/SLP consults ordered during this hospitalization. Pt is independent with ADLs at baseline. CM placed a referral to [facility] for pt to become established with a PCP. No other supportive care needs identified. Pt agrees with d/c plan. Milestones met. 10/12/22 1300   Service Assessment   Patient Orientation Alert and Oriented;Person;Place;Situation;Self   Cognition Alert   History Provided By Patient;Medical Record   Primary Caregiver Self   Accompanied By/Relationship Arina Coronadoonofre (spouse)   Support Systems Spouse/Significant Other   Patient's Healthcare Decision Maker is: Legal Next of Kin   PCP Verified by CM No  (Pt in agreement for referral to [facility])   Prior Functional Level Independent in ADLs/IADLs   Current Functional Level Independent in ADLs/IADLs   Can patient return to prior living arrangement Yes   Ability to make needs known: Good   Family able to assist with home care needs: Yes   Would you like for me to discuss the discharge plan with any other family members/significant others, and if so, who? No   Financial Resources Other (Comment)  (BCBS)   Social/Functional History   Lives With Spouse   Type of Home Apartment   Receives Help From Family   ADL Assistance Independent   Homemaking Assistance Independent   Ambulation Assistance Independent   Transfer Assistance Independent   Active  Yes   Mode of Transportation Car   Occupation Unemployed   Discharge Planning   Type of Residence Apartment   Living Arrangements Spouse/Significant Other   Current Services Prior To Admission None   Potential Assistance Needed N/A   DME Ordered? No   Potential Assistance Purchasing Medications No   Type of Home Care Services None   Patient expects to be discharged to: Apartment   History of falls?  0   Services At/After Discharge   Transition of Care Consult (CM Consult) Discharge Planning   Services At/After Discharge None   [employer] Information Provided? No   Mode of Transport at Discharge Other (see comment)  (Family)   Confirm Follow Up Transport Family   Condition of Participation: Discharge Planning   The Plan for Transition of Care is related to the following treatment goals: Pt to obtain care to become medically stable and to return with a safe transition. The Patient and/or Patient Representative was provided with a Choice of Provider? Patient   The Patient and/Or Patient Representative agree with the Discharge Plan? Yes   Freedom of Choice list was provided with basic dialogue that supports the patient's individualized plan of care/goals, treatment preferences, and shares the quality data associated with the providers?   Yes

## 2022-10-12 NOTE — OP NOTE
[facility]  OPERATIVE REPORT    Name:  Ayana Kennedy  MR#:  [id number]  :  1960  ACCOUNT #:  [de-identified]  DATE OF SERVICE:  10/11/2022    PREOPERATIVE DIAGNOSIS:  Pelvic mass and elevated CA-125. POSTOPERATIVE DIAGNOSIS:  Borderline tumor, left ovary removed intact. PROCEDURES PERFORMED:  1. Cystoscopy with intraoperative ureteral catheters. 2.  Total abdominal hysterectomy, bilateral salpingo-oophorectomy. 3.  Infracolic omentectomy. 4.  Left pelvic lymph node sampling. 5.  Peritoneal biopsies. SURGEON:  Carlitos Turner MD    ASSISTANT:  Cortez Garcia NP    ANESTHESIA:  General.    COMPLICATIONS:  None. SPECIMENS REMOVED:  Pathology above and washings. ESTIMATED BLOOD LOSS:  100 mL. PACKS:  None. DRAINS:  Straight Saul catheter. DISPOSITION:  PACU. INDICATIONS:  This is a patient who was referred after a large mass was noted, followed by badly elevated CA-125. Risks, benefits, and alternatives, morbidities and mortalities were reviewed and she wished to proceed. FINDINGS:  Large left ovarian cystic mass removed intact. No evidence of metastatic disease. Ureters normal.  Bladder appeared normal.    PROCEDURE:  The patient was taken to the operating room, placed under general anesthesia, and sterilely prepped and draped. Cystoscopy was performed. 5-Ugandan ureteral catheters placed bilaterally without difficulty. Saul catheter placed. Vertical midline incision made, carried down to fascia. Fascia was opened and peritoneal cavity was entered. Findings were as above. Washings were taken. The incision was extended to allow exteriorization of the tumor. Once this was exteriorized, the ureter was identified. The IP and utero-ovarian pedicles were isolated, double clamped, incised with an Enseal and the tumor was removed intact. Subsequently, Haseeb retractor was placed. The bowel was run. No involvement was noted.   Infracolic omentectomy was performed using LigaSure to maintain hemostasis and excised. Once this was accomplished, the bowel was run. The appendix normal.  Bowel normal.  Colon normal.    No evidence of metastatic disease intra-abdominally. Liver and gallbladder were also normal in appearance. The bowel was packed in the upper abdomen. Uterus grasped bilaterally. Retroperitoneal space is further opened. The proximal IP on the left was further isolated, clamped, incised, ligature placed proximally. On the right, the ureter was identified, IP isolated, window made between the two. The IP was clamped, incised with a LigaSure. Ligature was placed proximally. The anterior cul-de-sac was incised. Bladder flap was created sharply   the external cervical os. The uterine vessels were skeletonized bilaterally and they were grasped with clamps at the level of the internal cervical os, incised, suture ligatures placed. This was continued to the level of the external cervical os. Clamps were placed across the vaginal apex. Specimen removed. [place] was closed laterally with Onesimo, midline with a running locked self-locking Vicryl suture. Irrigation was performed. Hemostasis was confirmed at all operative sites. Hemostatic product was placed over the denuded pelvis. Prior to this, multiple peritoneal biopsies were taken and left common lymph node removed which was visible, but not pathologic. Neurovascular structures on the left were preserved. Subsequently, The bowel was allowed to fall to its normal location. Anterior abdominal wall was closed with modified Smead-Harrison closure using looped PDS. Subcutaneous tissue was irrigated, hemostasis obtained with Bovie, reapproximated in two layers with Vicryl followed by subcuticular and Dermabond. Ureteral catheters removed. Sponge, lap and needle counts correct. The patient tolerated the procedure well and was taken to PACU stable per Anesthesia.         NABIL HOPSON MD [id number]  D:  10/11/2022 16:45  T:  10/11/2022 22:39  JOB #:  [id number]

## 2022-10-12 NOTE — ANESTHESIA POSTPROCEDURE EVALUATION
Department of Anesthesiology  Postprocedure Note    Patient: Elmo Stoner  MRN: [id number]  YOB: 1960  Date of evaluation: 10/11/2022      Procedure Summary     Date: 10/11/22 Room / Location: [facility] MAIN OR  / [facility] MAIN OR    Anesthesia Start: 1430 Anesthesia Stop: 8094    Procedures:       TOTAL ABDOMINAL HYSTERECTOMY BILATERAL SALPINGO OOPHERECTOMY AND POSSIBLE STAGING (Abdomen)      CYSTOSCOPY URETERAL STENT INSERTION (Bilateral: Ureter) Diagnosis:       Abdominal mass      (Abdominal mass [R19.00])    Providers: Renetta Ortega MD Responsible Provider: Shahriar Benoit MD    Anesthesia Type: general ASA Status: 2          Anesthesia Type: No value filed.     Piyush Phase I: Piyush Score: 9    Piyush Phase II:        Anesthesia Post Evaluation    Patient location during evaluation: PACU  Patient participation: complete - patient participated  Level of consciousness: awake  Airway patency: patent  Nausea & Vomiting: no nausea  Complications: no  Cardiovascular status: blood pressure returned to baseline and hemodynamically stable  Respiratory status: acceptable  Hydration status: stable  Multimodal analgesia pain management approach

## 2022-10-12 NOTE — DISCHARGE SUMMARY
Discharge Summary    Date: 10/12/2022  Patient Name: Kathryn Powell    YOB: 1960     Age: 60 y.o. 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 [facility] Stay  Narrative of Hospital Course:  uncomplicated    Consultants:  None    Surgeries/procedures Performed:      Treatments:    Surgery        Discharge Plan/Disposition:  Home    Hospital/Incidental Findings Requiring Follow Up:    Patient Instructions:    Diet:    Activity:Activity as Tolerated  For number of days (if applicable): Other Instructions: Pelvic rest 8-10 weeks    Provider Follow-Up:   No follow-ups on file.      Significant Diagnostic Studies:    Recent Labs:  Admission on 10/11/2022  Hemoglobin                                    Date: 10/11/2022  Value: 13.9        Ref range: 11.7 - 15.4 g/dL   Status: Final  Crossmatch expiration date                    Date: 10/11/2022  Value: 10/14/2022,2359                       Status: Final  ABO/Rh                                        Date: 10/11/2022  Value: A NEGATIVE    Status: Final  Antibody Screen                               Date: 10/11/2022  Value: NEG           Status: Final  Sodium                                        Date: 10/12/2022  Value: 136         Ref range: 133 - 143 mmol/L   Status: Final  Potassium                                     Date: 10/12/2022  Value: 4.0         Ref range: 3.5 - 5.1 mmol/L   Status: Final  Chloride                                      Date: 10/12/2022  Value: 107         Ref range: 101 - 110 mmol/L   Status: Final  CO2                                           Date: 10/12/2022  Value: 24 Ref range: 21 - 32 mmol/L     Status: Final  Anion Gap                                     Date: 10/12/2022  Value: 5           Ref range: 2 - 11 mmol/L      Status: Final  Glucose                                       Date: 10/12/2022  Value: 120 (A)     Ref range: 65 - 100 mg/dL     Status: Final  BUN                                           Date: 10/12/2022  Value: 8           Ref range: 8 - 23 MG/DL       Status: Final  Creatinine                                    Date: 10/12/2022  Value: 0.50 (A)    Ref range: 0.6 - 1.0 MG/DL    Status: Final  Est, Glom Filt Rate                           Date: 10/12/2022  Value: >60         Ref range: >60 ml/min/1.73m2  Status: Final                Comment:      Pediatric calculator link: [employer]ur[employer]. org/professionals/kdoqi/gfr_calculatorped       Effective Oct 3, 2022       These results are not intended for use in patients <25years of age. eGFR results are calculated without a race factor using  the 2021 CKD-EPI equation. Careful clinical correlation is recommended, particularly when comparing to results calculated using previous equations. The CKD-EPI equation is less accurate in patients with extremes of muscle mass, extra-renal metabolism of creatinine, excessive creatine ingestion, or following therapy that affects renal tubular secretion.     Calcium                                       Date: 10/12/2022  Value: 8.4         Ref range: 8.3 - 10.4 MG/DL   Status: Final  WBC                                           Date: 10/12/2022  Value: 12.8 (A)    Ref range: 4.3 - 11.1 K/uL    Status: Final  RBC                                           Date: 10/12/2022  Value: 4.02 (A)    Ref range: 4.05 - 5.2 M/uL    Status: Final  Hemoglobin                                    Date: 10/12/2022  Value: 11.2 (A)    Ref range: 11.7 - 15.4 g/dL   Status: Final  Hematocrit                                    Date: 10/12/2022  Value: 36.1        Ref range: 35.8 - 46.3 % Status: Final  MCV                                           Date: 10/12/2022  Value: 89.8        Ref range: 82 - 102 FL        Status: Final  MCH                                           Date: 10/12/2022  Value: 27.9        Ref range: 26.1 - 32.9 PG     Status: Final  MCHC                                          Date: 10/12/2022  Value: 31.0 (A)    Ref range: 31.4 - 35.0 g/dL   Status: Final  RDW                                           Date: 10/12/2022  Value: 13.2        Ref range: 11.9 - 14.6 %      Status: Final  Platelets                                     Date: 10/12/2022  Value: 408         Ref range: 150 - 450 K/uL     Status: Final  MPV                                           Date: 10/12/2022  Value: 9.2 (A)     Ref range: 9.4 - 12.3 FL      Status: Final  nRBC                                          Date: 10/12/2022  Value: 0.00        Ref range: 0.0 - 0.2 K/uL     Status: Final                Comment: **Note: Absolute NRBC parameter is now reported with Hemogram**  Differential Type                             Date: 10/12/2022  Value: AUTOMATED   Ref range:                    Status: Final  Seg Neutrophils                               Date: 10/12/2022  Value: 81 (A)      Ref range: 43 - 78 %          Status: Final  Lymphocytes                                   Date: 10/12/2022  Value: 10 (A)      Ref range: 13 - 44 %          Status: Final  Monocytes                                     Date: 10/12/2022  Value: 8           Ref range: 4.0 - 12.0 %       Status: Final  Eosinophils %                                 Date: 10/12/2022  Value: 0 (A)       Ref range: 0.5 - 7.8 %        Status: Final  Basophils                                     Date: 10/12/2022  Value: 0           Ref range: 0.0 - 2.0 %        Status: Final  Immature Granulocytes                         Date: 10/12/2022  Value: 1           Ref range: 0.0 - 5.0 %        Status: Final  Segs Absolute Date: 10/12/2022  Value: 10.4 (A)    Ref range: 1.7 - 8.2 K/UL     Status: Final  Absolute Lymph #                              Date: 10/12/2022  Value: 1.3         Ref range: 0.5 - 4.6 K/UL     Status: Final  Absolute Mono #                               Date: 10/12/2022  Value: 1.0         Ref range: 0.1 - 1.3 K/UL     Status: Final  Absolute Eos #                                Date: 10/12/2022  Value: 0.0         Ref range: 0.0 - 0.8 K/UL     Status: Final  Basophils Absolute                            Date: 10/12/2022  Value: 0.0         Ref range: 0.0 - 0.2 K/UL     Status: Final  Absolute Immature Granulocyte                 Date: 10/12/2022  Value: 0.1         Ref range: 0.0 - 0.5 K/UL     Status: Final  ------------    Radiology last 7 days:  No results found. Pending Labs     Order Current Status    Cytology, Non-Gyn Collected (10/11/22 1515)    Surgical Pathology Collected (10/11/22 1518)    Surgical Pathology Collected (10/11/22 1534)    Surgical Pathology Collected (10/11/22 1545)    Surgical Pathology Collected (10/11/22 1556)        Discharge Medications    Current Discharge Medication List    START taking these medications    ibuprofen (ADVIL;MOTRIN) 600 MG tablet  Take 1 tablet by mouth in the morning and 1 tablet at noon and 1 tablet in the evening. Do all this for 7 doses.   Qty: 120 tablet Refills: 3    sennosides-docusate sodium (SENOKOT-S) 8.6-50 MG tablet  Take 2 tablets by mouth daily as needed for Constipation  Qty: 30 tablet Refills: 0    metoclopramide (REGLAN) 5 MG tablet  Take 1 tablet by mouth 3 times daily (before meals) for 10 days  Qty: 30 tablet Refills: 0          Current Discharge Medication List        Current Discharge Medication List    CONTINUE these medications which have NOT CHANGED    ondansetron (ZOFRAN ODT) 4 MG disintegrating tablet  Take 1 tablet by mouth every 8 hours as needed for Nausea or Vomiting  Qty: 45 tablet Refills: 1    prochlorperazine (COMPAZINE) 5 MG tablet  Take 1 tablet by mouth every 6 hours as needed for Nausea  Qty: 30 tablet Refills: 0    HYDROcodone-acetaminophen (NORCO)  MG per tablet  Take 1 tablet by mouth every 6 hours as needed for Pain for up to 14 days. Intended supply: 30 days  Qty: 40 tablet Refills: 0  Comments: Reduce doses taken as pain becomes manageable  Associated Diagnoses:Abdominal mass, unspecified abdominal location          Current Discharge Medication List        Time Spent on Discharge:  minutes were spent in patient examination, evaluation, counseling as well as medication reconciliation, prescriptions for required medications, discharge plan, and follow up.     Electronically signed by Simon Santamaria MD on 10/12/22 at 12:45 PM EDT

## 2022-10-12 NOTE — PLAN OF CARE
Problem: Pain  Goal: Verbalizes/displays adequate comfort level or baseline comfort level  10/12/2022 1346 by Kiley Lobo RN  Outcome: Completed  Flowsheets (Taken 10/12/2022 0825)  Verbalizes/displays adequate comfort level or baseline comfort level: Encourage patient to monitor pain and request assistance  10/12/2022 0006 by Timothy Cortes RN  Outcome: Progressing

## 2022-10-12 NOTE — ACP (ADVANCE CARE PLANNING)
Advance Care Planning     General Advance Care Planning (ACP) Conversation    Date of Conversation: 10/7/2022  Conducted with: Patient with Decision Making Capacity    Healthcare Decision Maker:    Primary Decision Maker: Jordan Zayas - Spouse - [phone]  Click here to complete Healthcare Decision Makers including selection of the Healthcare Decision Maker Relationship (ie \"Primary\"). Today we documented Decision Maker(s) consistent with Legal Next of Kin hierarchy. Content/Action Overview:  Has NO ACP documents/care preferences - refer to ACP Clinical Specialist  Reviewed DNR/DNI and patient elects Full Code (Attempt Resuscitation)  hospitalization preferences and resuscitation preferences  No ACP documents on file. Full Code per physician order.     Length of Voluntary ACP Conversation in minutes:  <16 minutes (Non-Billable)    ZEE Bhandari

## 2022-10-12 NOTE — ANESTHESIA PROCEDURE NOTES
Peripheral Block    Patient location during procedure: OR  Reason for block: post-op pain management and at surgeon's request  Start time: 10/11/2022 4:33 PM  End time: 10/11/2022 4:38 PM  Staffing  Performed: anesthesiologist   Anesthesiologist: Lauren Hines MD  Preanesthetic Checklist  Completed: patient identified, IV checked, site marked, risks and benefits discussed, surgical/procedural consents, equipment checked, pre-op evaluation, timeout performed, anesthesia consent given, oxygen available and monitors applied/VS acknowledged  Peripheral Block   Patient position: supine  Prep: ChloraPrep  Provider prep: mask and sterile gloves  Patient monitoring: cardiac monitor, continuous pulse ox, continuous capnometry, frequent blood pressure checks, IV access and oxygen  Block type: Rectus sheath  Laterality: bilateral  Injection technique: single-shot  Guidance: ultrasound guided    Needle   Needle type: insulated echogenic nerve stimulator needle   Needle gauge: 20 G  Needle localization: ultrasound guidance  Needle insertion depth: 6 cm  Test dose: negative  Needle length: 10 cm  Assessment   Injection assessment: negative aspiration for heme, local visualized surrounding nerve on ultrasound and no intravascular symptoms  Paresthesia pain: none  Slow fractionated injection: yes  Hemodynamics: stable  Real-time US image taken/store: yes  Outcomes: uncomplicated and patient tolerated procedure well    Additional Notes  Ropivacaine diluted to 0.25% final concentration with NS (15 cc per side) injected bilaterally using ultrasound guidance.    Medications Administered  ropivacaine (NAROPIN) injection 0.5% - Perineural   20 mL - 10/11/2022 4:38:00 PM

## 2022-10-12 NOTE — ANESTHESIA PROCEDURE NOTES
Peripheral Block    Patient location during procedure: OR  Reason for block: post-op pain management and at surgeon's request  Start time: 10/11/2022 4:39 PM  End time: 10/11/2022 4:43 PM  Staffing  Performed: anesthesiologist   Anesthesiologist: Brian Fox MD  Preanesthetic Checklist  Completed: patient identified, IV checked, site marked, risks and benefits discussed, surgical/procedural consents, equipment checked, pre-op evaluation, timeout performed, anesthesia consent given, oxygen available and monitors applied/VS acknowledged  Peripheral Block   Patient position: supine  Prep: ChloraPrep  Provider prep: mask and sterile gloves  Patient monitoring: cardiac monitor, continuous pulse ox, continuous capnometry, frequent blood pressure checks, IV access and oxygen  Block type: TAP  Laterality: bilateral  Injection technique: single-shot  Guidance: ultrasound guided    Needle   Needle type: insulated echogenic nerve stimulator needle   Needle gauge: 20 G  Needle localization: ultrasound guidance  Needle insertion depth: 6 cm  Test dose: negative  Needle length: 10 cm  Assessment   Injection assessment: negative aspiration for heme, local visualized surrounding nerve on ultrasound and no intravascular symptoms  Paresthesia pain: none  Slow fractionated injection: yes  Hemodynamics: stable  Real-time US image taken/store: yes  Outcomes: uncomplicated and patient tolerated procedure well    Additional Notes  Ropivacaine diluted to 0.2% final concentration with NS (20 cc per side) injected bilaterally using ultrasound guidance.    Medications Administered  ropivacaine (NAROPIN) injection 0.5% - Perineural   20 mL - 10/11/2022 4:43:00 PM

## 2022-10-12 NOTE — CARE COORDINATION
Case Management Assessment  Initial Evaluation    Date/Time of Evaluation: 10/12/2022 1:03 PM  Assessment Completed by: ZEE Delaney    If patient is discharged prior to next notation, then this note serves as note for discharge by case management. 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 No (Pt in agreement for referral to [facility])    Chart Reviewed: Yes      History Provided by: Patient, Medical Record  Patient Orientation: Alert and Oriented, Person, Place, Situation, Self    Patient Cognition: Alert    Hospitalization in the last 30 days (Readmission):  No    If yes, Readmission Assessment in CM Navigator will be completed. Advance Directives:      Code Status: Full Code   Patient's Primary Decision Maker is: Legal Next of Kin      Discharge Planning:    Patient lives with: Spouse/Significant Other Type of Home: Apartment  Primary Care Giver: Self  Patient Support Systems include: Spouse/Significant Other   Current Financial resources: Other (Comment) (BCBS)  Current community resources:    Current services prior to admission: None            Current DME:              Type of Home Care services:  None    ADLS  Prior functional level: Independent in ADLs/IADLs  Current functional level: Independent in ADLs/IADLs    PT AM-PAC:   /24  OT AM-PAC:   /24    Family can provide assistance at DC: Yes  Would you like Case Management to discuss the discharge plan with any other family members/significant others, and if so, who?  No  Plans to Return to Present Housing: Yes  Other Identified Issues/Barriers to RETURNING to current housing: None  Potential Assistance needed at discharge: N/A            Potential DME:  None  Patient expects to discharge to: [address] [place] [address] for transportation at discharge: Family    Financial    Payor: Yamila Barfield / Plan: [employer] / Product Type: *No Product type* /     Does insurance require precert for SNF: Yes    Potential assistance Purchasing Medications: No  Meds-to-Beds request:        [facility] [address], [address] [place] [address] [phone] Racquel Pratt [phone]  [address] [address]  Phone: [phone] Fax: [address], [address] [facility] [phone] - f [phone]  [address] [place],[address] [place] [address]  Phone: [phone] Fax: [phone]      Notes:    Factors facilitating achievement of predicted outcomes: Family support, Cooperative, and Pleasant    Barriers to discharge: Pain    Additional Case Management Notes: Thaddeus 59 y/o female pt who resides with her spouse Jewels Fox [phone]. She has insurance with pharmacy benefits. She does not have a PCP and is agreeable for CM to make a referral to [facility] (referral placed today). Pt is independent with ADLs at baseline and does not have any home DME, HH, or previous STR. No d/c needs identified. CM will continue to follow and remain available if any needs arise. D/C plan is for pt to return home with spouse when cleared by Dr. Los Shine. 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  Freedom of choice list with basic dialogue that supports the patient's individualized plan of care/goals and shares the quality data associated with the providers was provided to: Patient   Patient Representative Name:       The Patient and/or Patient Representative Agree with the Discharge Plan?  Yes    ZEE Sanon  Case Management Department

## 2022-10-13 ENCOUNTER — TELEPHONE (OUTPATIENT)
Dept: INTERNAL MEDICINE CLINIC | Facility: CLINIC | Age: 62
End: 2022-10-13

## 2022-10-13 NOTE — TELEPHONE ENCOUNTER
Called patient to schedule TCV appt following discharge from [facility] 10/12/2022. 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 Patient states she prefers to wait to establish with a PCP until after she sees Dr Luis Angel Quiñones and gets what she has going on taken care of. Patient did not have questions or concerns.

## 2022-10-14 ENCOUNTER — TELEPHONE (OUTPATIENT)
Dept: CASE MANAGEMENT | Age: 62
End: 2022-10-14

## 2022-10-14 NOTE — TELEPHONE ENCOUNTER
Called Prasanth Alvarado to see how she was doing post op and at home. I had to leave  and gave her my direct number to call me back if needed.  I left her post op appointment of 10/19 @ 1200 on

## 2022-10-18 NOTE — PROGRESS NOTES
Pov    Stage 1a mucinous carcinoma, within borderline tumor    Recommended egd/colonoscopy with rare tumor type to complete assessment gi, but no adj therapy recommended, stage 1a muc. Moniter  Pt with no children/living rel reported, no genetics with muc within lmp desired by pt      Name:    Yady Celis   Accession Number:   [id number]   MR #   [id number]   Date Obtained:   10/11/2022   DIAGNOSIS        A:  \"LEFT TUBE AND OVARY\":             INVASIVE MUCINOUS ADENOCARCINOMA (CONFLUENT, EXPANSILE PATTERN)                 ARISING IN AN ATYPICAL PROLIFERATIVE (BORDERLINE) MUCINOUS                       TUMOR. TUMOR IS 28.4 CM IN GREATEST DIMENSION. CARCINOMA IS LIMITED TO OVARY. BENIGN FALLOPIAN TUBE. SEE ATTACHED CANCER CHECKLIST. B:  \"OMENTUM\":             NO CARCINOMA IDENTIFIED. ONE LYMPH NODE NEGATIVE FOR METASTATIC CARCINOMA (0/1). C:  \"UTERUS, LEFT PROXIMAL IP, RIGHT TUBE, OVARY\":             CERVIX:                  CHRONIC INFLAMMATION AND SQUAMOUS METAPLASIA.                          ENDOMETRIUM:               INACTIVE ENDOMETRIUM. MYOMETRIUM:                  HISTOLOGICALLY UNREMARKABLE MYOMETRIUM. RIGHT OVARY AND FALLOPIAN TUBE:                  BENIGN OVARY AND FALLOPIAN TUBE. DETACHED FRAGMENTS OF BENIGN FIBROVASCULAR TISSUE. D:  \"LEFT GUTTER\":             NO CARCINOMA IDENTIFIED. E:  Rodger Gala":        NO CARCINOMA IDENTIFIED. F:  \"LEFT PELVIC LYMPH NODE\":             ONE LYMPH NODE NEGATIVE FOR METASTATIC CARCINOMA (0/1).           Sign Out Date: 10/13/2022  Peter Coffman MD       Microscopic Description        A  F: Surgical Pathology Cancer Case Summary        PROCEDURE:   Total abdominal hysterectomy, bilateral   salpingo-oophorectomy, omentectomy, peritoneal biopsies and pelvic lymph   node sampling   SPECIMEN INTEGRITY OF LEFT OVARY: Capsule intact   TUMOR SITE:   Left ovary   HISTOLOGIC TYPE: Mucinous carcinoma with expansile invasive pattern   arising in an atypical proliferative (borderline) mucinous tumor   TUMOR SIZE:   28.4 cm   OVARIAN SURFACE INVOLVEMENT:   Absent   FALLOPIAN TUBE SURFACE INVOLVEMENT:   Absent   IMPLANTS:   Not identified   OTHER TISSUE / ORGAN INVOLVEMENT:   Not identified   PERITONEAL / ASCITIC FLUID:   Negative for malignancy (normal / benign)   TREATMENT EFFECT:   No known presurgical therapy   REGIONAL LYMPH NODES:   All lymph nodes negative for tumor cells   NUMBER OF LYMPH NODES EXAMINED:   1      PATHOLOGIC STAGE CLASSIFICATION (pTNM, AJCC 8th Edition): pT1a N0 [FIGO   IA]     Dr. Dee mayen

## 2022-10-19 ENCOUNTER — OFFICE VISIT (OUTPATIENT)
Dept: ONCOLOGY | Age: 62
End: 2022-10-19

## 2022-10-19 VITALS
TEMPERATURE: 97.9 F | SYSTOLIC BLOOD PRESSURE: 144 MMHG | OXYGEN SATURATION: 97 % | HEIGHT: 64 IN | HEART RATE: 71 BPM | RESPIRATION RATE: 16 BRPM | BODY MASS INDEX: 31.58 KG/M2 | WEIGHT: 185 LBS | DIASTOLIC BLOOD PRESSURE: 81 MMHG

## 2022-10-19 DIAGNOSIS — C56.9 MALIGNANT NEOPLASM OF OVARY, UNSPECIFIED LATERALITY (HCC): Primary | ICD-10-CM

## 2022-10-19 PROCEDURE — 99024 POSTOP FOLLOW-UP VISIT: CPT | Performed by: OBSTETRICS & GYNECOLOGY

## 2022-10-19 ASSESSMENT — ANXIETY QUESTIONNAIRES
2. NOT BEING ABLE TO STOP OR CONTROL WORRYING: 0
GAD7 TOTAL SCORE: 0
1. FEELING NERVOUS, ANXIOUS, OR ON EDGE: 0
4. TROUBLE RELAXING: 0
5. BEING SO RESTLESS THAT IT IS HARD TO SIT STILL: 0
3. WORRYING TOO MUCH ABOUT DIFFERENT THINGS: 0
IF YOU CHECKED OFF ANY PROBLEMS ON THIS QUESTIONNAIRE, HOW DIFFICULT HAVE THESE PROBLEMS MADE IT FOR YOU TO DO YOUR WORK, TAKE CARE OF THINGS AT HOME, OR GET ALONG WITH OTHER PEOPLE: NOT DIFFICULT AT ALL
6. BECOMING EASILY ANNOYED OR IRRITABLE: 0
7. FEELING AFRAID AS IF SOMETHING AWFUL MIGHT HAPPEN: 0

## 2022-10-19 ASSESSMENT — PATIENT HEALTH QUESTIONNAIRE - PHQ9
1. LITTLE INTEREST OR PLEASURE IN DOING THINGS: 0
SUM OF ALL RESPONSES TO PHQ QUESTIONS 1-9: 1
6. FEELING BAD ABOUT YOURSELF - OR THAT YOU ARE A FAILURE OR HAVE LET YOURSELF OR YOUR FAMILY DOWN: 0
2. FEELING DOWN, DEPRESSED OR HOPELESS: 0
5. POOR APPETITE OR OVEREATING: 0
SUM OF ALL RESPONSES TO PHQ QUESTIONS 1-9: 1
8. MOVING OR SPEAKING SO SLOWLY THAT OTHER PEOPLE COULD HAVE NOTICED. OR THE OPPOSITE, BEING SO FIGETY OR RESTLESS THAT YOU HAVE BEEN MOVING AROUND A LOT MORE THAN USUAL: 0
SUM OF ALL RESPONSES TO PHQ QUESTIONS 1-9: 1
3. TROUBLE FALLING OR STAYING ASLEEP: 0
7. TROUBLE CONCENTRATING ON THINGS, SUCH AS READING THE NEWSPAPER OR WATCHING TELEVISION: 0
4. FEELING TIRED OR HAVING LITTLE ENERGY: 1
SUM OF ALL RESPONSES TO PHQ QUESTIONS 1-9: 1
SUM OF ALL RESPONSES TO PHQ9 QUESTIONS 1 & 2: 0
9. THOUGHTS THAT YOU WOULD BE BETTER OFF DEAD, OR OF HURTING YOURSELF: 0

## 2023-05-04 DIAGNOSIS — C56.9 MALIGNANT NEOPLASM OF OVARY, UNSPECIFIED LATERALITY (HCC): Primary | ICD-10-CM

## 2023-05-11 ENCOUNTER — OFFICE VISIT (OUTPATIENT)
Dept: ONCOLOGY | Age: 63
End: 2023-05-11
Payer: COMMERCIAL

## 2023-05-11 ENCOUNTER — HOSPITAL ENCOUNTER (OUTPATIENT)
Dept: LAB | Age: 63
Discharge: HOME | End: 2023-05-11
Payer: COMMERCIAL

## 2023-05-11 VITALS
WEIGHT: 183 LBS | HEIGHT: 64 IN | RESPIRATION RATE: 14 BRPM | SYSTOLIC BLOOD PRESSURE: 136 MMHG | DIASTOLIC BLOOD PRESSURE: 78 MMHG | BODY MASS INDEX: 31.24 KG/M2 | TEMPERATURE: 97.7 F | OXYGEN SATURATION: 100 % | HEART RATE: 59 BPM

## 2023-05-11 DIAGNOSIS — C56.9 MALIGNANT NEOPLASM OF OVARY, UNSPECIFIED LATERALITY (HCC): Primary | ICD-10-CM

## 2023-05-11 DIAGNOSIS — Z12.31 SCREENING MAMMOGRAM, ENCOUNTER FOR: ICD-10-CM

## 2023-05-11 DIAGNOSIS — C56.9 MALIGNANT NEOPLASM OF OVARY, UNSPECIFIED LATERALITY (HCC): ICD-10-CM

## 2023-05-11 DIAGNOSIS — Z76.89 ENCOUNTER TO ESTABLISH CARE: ICD-10-CM

## 2023-05-11 DIAGNOSIS — D39.12 NEOPLASM OF LEFT OVARY WITH BORDERLINE MALIGNANT FEATURES: ICD-10-CM

## 2023-05-11 LAB — CANCER AG125 SERPL-ACNC: 6 U/ML (ref 1.5–35)

## 2023-05-11 PROCEDURE — 36415 COLL VENOUS BLD VENIPUNCTURE: CPT

## 2023-05-11 PROCEDURE — 86304 IMMUNOASSAY TUMOR CA 125: CPT

## 2023-05-11 PROCEDURE — 99213 OFFICE O/P EST LOW 20 MIN: CPT | Performed by: NURSE PRACTITIONER

## 2023-05-11 RX ORDER — PSYLLIUM HUSK 0.4 G
0.52 CAPSULE ORAL DAILY
COMMUNITY

## 2023-05-11 ASSESSMENT — PATIENT HEALTH QUESTIONNAIRE - PHQ9
2. FEELING DOWN, DEPRESSED OR HOPELESS: 0
SUM OF ALL RESPONSES TO PHQ9 QUESTIONS 1 & 2: 0
SUM OF ALL RESPONSES TO PHQ QUESTIONS 1-9: 0
1. LITTLE INTEREST OR PLEASURE IN DOING THINGS: 0
SUM OF ALL RESPONSES TO PHQ QUESTIONS 1-9: 0

## 2023-05-16 ASSESSMENT — ENCOUNTER SYMPTOMS
RESPIRATORY NEGATIVE: 1
EYES NEGATIVE: 1
GASTROINTESTINAL NEGATIVE: 1
ALLERGIC/IMMUNOLOGIC NEGATIVE: 1

## 2023-05-25 ENCOUNTER — TELEPHONE (OUTPATIENT)
Dept: ONCOLOGY | Age: 63
End: 2023-05-25

## 2023-05-25 NOTE — TELEPHONE ENCOUNTER
----- Message from [address] sent at 5/25/2023  8:19 AM EDT -----  Please notify patient that their lab results are normal.

## 2023-10-06 ENCOUNTER — TELEPHONE (OUTPATIENT)
Dept: ONCOLOGY | Age: 63
End: 2023-10-06

## (undated) DEVICE — BAG,DRAINAGE,4L,A/R TOWER,LL,SLIDE TAP: Brand: MEDLINE

## (undated) DEVICE — SUTURE VCRL SZ 0 L36IN ABSRB UD L36MM CT-1 1/2 CIR J946H

## (undated) DEVICE — SUTURE ABSORBABLE BRAIDED 0 CT-1 8X27 IN UD VICRYL JJ41G

## (undated) DEVICE — APPLICATOR MEDICATED 26 CC SOLUTION HI LT ORNG CHLORAPREP

## (undated) DEVICE — WOUND RETRACTOR AND PROTECTOR: Brand: ALEXIS O WOUND PROTECTOR-RETRACTOR

## (undated) DEVICE — SUTURE VCRL SZ 2-0 L27IN ABSRB VLT L36MM CT-1 1/2 CIR J339H

## (undated) DEVICE — GUIDEWIRE ENDOSCP L150CM DIA0.038IN TIP L3CM PTFE FLX STR

## (undated) DEVICE — SUTURE PDS + SZ 1 L96IN ABSRB VLT L65MM TP-1 1/2 CIR PDP880G

## (undated) DEVICE — ELECTRODE PT RET AD L9FT HI MOIST COND ADH HYDRGEL CORDED

## (undated) DEVICE — CATHETER URETH 16FR BLLN 5CC L16IN SIL F INDWL 2 W SHT LEN

## (undated) DEVICE — SOLUTION IRRIG 1000ML STRL H2O USP PLAS POUR BTL

## (undated) DEVICE — SOLUTION IRRIG 3000ML 0.9% SOD CHL USP UROMATIC PLAS CONT

## (undated) DEVICE — AGENT HEMSTAT 3GM OXIDIZED REGENERATED CELOS ABSRB FOR CONT (ORDER MULTIPLES OF 5EA)

## (undated) DEVICE — GARMENT,MEDLINE,DVT,INT,CALF,MED, GEN2: Brand: MEDLINE

## (undated) DEVICE — PACK SURGICAL PROCEDURE KIT CYSTOSCOPY TOTE

## (undated) DEVICE — SPONGE: SPECIALTY PEANUT XR 100/CS: Brand: MEDICAL ACTION INDUSTRIES

## (undated) DEVICE — SUTURE VCRL SZ 2-0 L36IN ABSRB UD L36MM CT-1 1/2 CIR J945H

## (undated) DEVICE — SUTURE VCRL SZ 3-0 L27IN ABSRB UD L19MM PS-2 3/8 CIR PRIM J427H

## (undated) DEVICE — CATHETER URET 5FR L70CM OPN END SGL LUMN INJ HUB FLEXIMA

## (undated) DEVICE — SEALER ENDOSCP NANO COAT OPN DIV CRV L JAW LIGASURE IMPACT

## (undated) DEVICE — MAJOR GENERAL: Brand: MEDLINE INDUSTRIES, INC.

## (undated) DEVICE — 3M™ TEGADERM™ TRANSPARENT FILM DRESSING FRAME STYLE, 1629, 8 IN X 12 IN (20 CM X 30 CM), 10/CT 8CT/CASE: Brand: 3M™ TEGADERM™

## (undated) DEVICE — SOLUTION IRRIG 1000ML 09% SOD CHL USP PIC PLAS CONTAINER